# Patient Record
Sex: MALE | Race: WHITE | NOT HISPANIC OR LATINO | Employment: FULL TIME | URBAN - METROPOLITAN AREA
[De-identification: names, ages, dates, MRNs, and addresses within clinical notes are randomized per-mention and may not be internally consistent; named-entity substitution may affect disease eponyms.]

---

## 2020-06-03 ENCOUNTER — OFFICE VISIT (OUTPATIENT)
Dept: URGENT CARE | Facility: CLINIC | Age: 30
End: 2020-06-03
Payer: COMMERCIAL

## 2020-06-03 VITALS
WEIGHT: 258 LBS | RESPIRATION RATE: 20 BRPM | SYSTOLIC BLOOD PRESSURE: 112 MMHG | HEART RATE: 108 BPM | HEIGHT: 73 IN | OXYGEN SATURATION: 95 % | TEMPERATURE: 98.3 F | DIASTOLIC BLOOD PRESSURE: 80 MMHG | BODY MASS INDEX: 34.19 KG/M2

## 2020-06-03 DIAGNOSIS — L03.114 CELLULITIS OF LEFT UPPER EXTREMITY: ICD-10-CM

## 2020-06-03 DIAGNOSIS — L73.9 FOLLICULITIS: ICD-10-CM

## 2020-06-03 PROCEDURE — 99204 OFFICE O/P NEW MOD 45 MIN: CPT | Performed by: PHYSICIAN ASSISTANT

## 2020-06-03 RX ORDER — CEPHALEXIN 500 MG/1
500 CAPSULE ORAL 4 TIMES DAILY
Qty: 28 CAP | Refills: 0 | Status: ON HOLD
Start: 2020-06-03 | End: 2020-06-08

## 2020-06-03 RX ORDER — IBUPROFEN 200 MG
800 TABLET ORAL EVERY 6 HOURS PRN
COMMUNITY
End: 2020-06-18

## 2020-06-03 RX ORDER — MUPIROCIN CALCIUM 20 MG/G
CREAM TOPICAL
Qty: 1 TUBE | Refills: 0 | Status: SHIPPED
Start: 2020-06-03 | End: 2020-06-05

## 2020-06-03 ASSESSMENT — ENCOUNTER SYMPTOMS
SENSORY CHANGE: 0
FEVER: 0
DIZZINESS: 0
NAUSEA: 0
SHORTNESS OF BREATH: 0
TINGLING: 0
CHILLS: 0
FOCAL WEAKNESS: 0
VOMITING: 0
HEADACHES: 0
ABDOMINAL PAIN: 0
COUGH: 0
MYALGIAS: 0

## 2020-06-03 NOTE — PROGRESS NOTES
"Subjective:      Cyrus Zuniga is a 30 y.o. male who presents with Other (believes it's a cyst or infection, left elbow, under bilateral armpits, had on pelvic and right foot, area, green drainage, on and off x 3-4 months)        Other   This is a new problem. Pertinent negatives include no abdominal pain, chest pain, chills, coughing, fever, headaches, myalgias, nausea or vomiting.   Patient presents with 3 to 4 months of intermittent worsening acne or tender red bumps located to left side of torso as well as under bilateral armpits, and left arm.  He is reports 2 small papules to his left arm and he had popped 1 of them located near his left elbow a while ago.  Green discharge came out.  Reports mild tenderness to palpation to the lesions.  He has surrounding redness and swelling to his left arm around his elbow area that has worsened over the past couple days.  Denies any itching, fevers, chills, weakness, numbness, tingling.  No new skin contacts.  No allergies.  He denies any other complaints or concerns.     Review of Systems   Constitutional: Negative for chills, fever and malaise/fatigue.   HENT: Negative.    Respiratory: Negative for cough and shortness of breath.    Cardiovascular: Negative for chest pain.   Gastrointestinal: Negative for abdominal pain, nausea and vomiting.   Musculoskeletal: Negative for myalgias.   Skin:        Red bumps  Acne  Redness    Neurological: Negative for dizziness, tingling, sensory change, focal weakness and headaches.   All other systems reviewed and are negative.         Objective:     /80 (BP Location: Right arm, Patient Position: Sitting, BP Cuff Size: Adult long)   Pulse (!) 108   Temp 36.8 °C (98.3 °F) (Temporal)   Resp 20   Ht 1.854 m (6' 1\")   Wt 117 kg (258 lb)   SpO2 95%   BMI 34.04 kg/m²      Physical Exam  Vitals signs reviewed.   Constitutional:       General: He is not in acute distress.     Appearance: Normal appearance. He is not ill-appearing or " toxic-appearing.   HENT:      Right Ear: Tympanic membrane normal.      Left Ear: Tympanic membrane normal.      Mouth/Throat:      Mouth: Mucous membranes are moist.      Pharynx: Oropharynx is clear. No oropharyngeal exudate or posterior oropharyngeal erythema.   Eyes:      Conjunctiva/sclera: Conjunctivae normal.      Pupils: Pupils are equal, round, and reactive to light.   Neck:      Musculoskeletal: Normal range of motion and neck supple. No neck rigidity or muscular tenderness.   Cardiovascular:      Rate and Rhythm: Normal rate and regular rhythm.      Heart sounds: Normal heart sounds.   Pulmonary:      Effort: Pulmonary effort is normal. No respiratory distress.      Breath sounds: Normal breath sounds. No wheezing, rhonchi or rales.   Lymphadenopathy:      Cervical: No cervical adenopathy.   Skin:     Comments: See Images below.   Erythematous macule papules with occasional pustules.  Mild TTP.     Left elbow/arm: moderate erythematous macule with trace edema, and mild tenderness to palpation.  There is no fluctuance of the olecranon bursa.   Full flexion extension without pain.  No crepitus  No bony abnormality or bony tenderness.   Neurological:      Mental Status: He is alert.                History reviewed. No pertinent past medical history. History reviewed. No pertinent surgical history.   Social History     Socioeconomic History   • Marital status: Single     Spouse name: Not on file   • Number of children: Not on file   • Years of education: Not on file   • Highest education level: Not on file   Occupational History   • Not on file   Social Needs   • Financial resource strain: Not on file   • Food insecurity     Worry: Not on file     Inability: Not on file   • Transportation needs     Medical: Not on file     Non-medical: Not on file   Tobacco Use   • Smoking status: Never Smoker   • Smokeless tobacco: Never Used   Substance and Sexual Activity   • Alcohol use: Yes     Comment: occ   • Drug use:  No   • Sexual activity: Not on file   Lifestyle   • Physical activity     Days per week: Not on file     Minutes per session: Not on file   • Stress: Not on file   Relationships   • Social connections     Talks on phone: Not on file     Gets together: Not on file     Attends Gnosticism service: Not on file     Active member of club or organization: Not on file     Attends meetings of clubs or organizations: Not on file     Relationship status: Not on file   • Intimate partner violence     Fear of current or ex partner: Not on file     Emotionally abused: Not on file     Physically abused: Not on file     Forced sexual activity: Not on file   Other Topics Concern   • Not on file   Social History Narrative   • Not on file    Patient has no known allergies.       Assessment/Plan:     1. Folliculitis    - mupirocin calcium (BACTROBAN) 2 % Cream; Apply to affected areas 2 times per day  Dispense: 1 Tube; Refill: 0    2. Cellulitis of left upper extremity    - cephALEXin (KEFLEX) 500 MG Cap; Take 1 Cap by mouth 4 times a day for 7 days.  Dispense: 28 Cap; Refill: 0    Discussed with patient and symptoms most likely consistent with folliculitis as well as cellulitis of his left upper extremity.  There are no signs of tenosynovitis.  Suspicions for olecranon bursitis are low.  He is overall very well-appearing.     Discussed treatment of cephalexin, warm compresses to elbow, ibuprofen and Tylenol for pain or discomfort.   Return to the urgent care in 3 days for reevaluation of his cellulitis.  Return earlier if any worsening redness, swelling, drainage, fevers, chills, pain or any other concerns.  Discussed red flags and indications to present to the emergency room.    After oral therapy, start Bactroban cream to folliculitis lesions twice per day.    Supportive care, differential diagnoses, and indications for immediate follow-up discussed with patient.    Pathogenesis of diagnosis discussed including typical length and  natural progression. Patient expresses understanding and agrees to plan.    Please note that this dictation was created using voice recognition software. I have made every reasonable attempt to correct obvious errors, but I expect that there are errors of grammar and possibly content that I did not discover before finalizing the note.

## 2020-06-05 ENCOUNTER — HOSPITAL ENCOUNTER (INPATIENT)
Facility: MEDICAL CENTER | Age: 30
LOS: 3 days | DRG: 603 | End: 2020-06-08
Attending: EMERGENCY MEDICINE | Admitting: HOSPITALIST
Payer: COMMERCIAL

## 2020-06-05 ENCOUNTER — APPOINTMENT (OUTPATIENT)
Dept: RADIOLOGY | Facility: MEDICAL CENTER | Age: 30
DRG: 603 | End: 2020-06-05
Attending: EMERGENCY MEDICINE
Payer: COMMERCIAL

## 2020-06-05 ENCOUNTER — OFFICE VISIT (OUTPATIENT)
Dept: URGENT CARE | Facility: CLINIC | Age: 30
End: 2020-06-05
Payer: COMMERCIAL

## 2020-06-05 VITALS
WEIGHT: 258 LBS | DIASTOLIC BLOOD PRESSURE: 76 MMHG | OXYGEN SATURATION: 98 % | TEMPERATURE: 99.1 F | RESPIRATION RATE: 18 BRPM | BODY MASS INDEX: 34.19 KG/M2 | HEART RATE: 102 BPM | SYSTOLIC BLOOD PRESSURE: 112 MMHG | HEIGHT: 73 IN

## 2020-06-05 DIAGNOSIS — L03.114 CELLULITIS OF LEFT UPPER EXTREMITY: ICD-10-CM

## 2020-06-05 DIAGNOSIS — Z87.2 HISTORY OF FOLLICULITIS: ICD-10-CM

## 2020-06-05 DIAGNOSIS — M70.22 OLECRANON BURSITIS OF LEFT ELBOW: ICD-10-CM

## 2020-06-05 DIAGNOSIS — M25.522 LEFT ELBOW PAIN: ICD-10-CM

## 2020-06-05 LAB
ALBUMIN SERPL BCP-MCNC: 4.4 G/DL (ref 3.2–4.9)
ALBUMIN/GLOB SERPL: 1.2 G/DL
ALP SERPL-CCNC: 78 U/L (ref 30–99)
ALT SERPL-CCNC: 54 U/L (ref 2–50)
ANION GAP SERPL CALC-SCNC: 11 MMOL/L (ref 7–16)
AST SERPL-CCNC: 29 U/L (ref 12–45)
BASOPHILS # BLD AUTO: 0.3 % (ref 0–1.8)
BASOPHILS # BLD: 0.04 K/UL (ref 0–0.12)
BILIRUB SERPL-MCNC: 1 MG/DL (ref 0.1–1.5)
BUN SERPL-MCNC: 18 MG/DL (ref 8–22)
CALCIUM SERPL-MCNC: 9.5 MG/DL (ref 8.5–10.5)
CHLORIDE SERPL-SCNC: 104 MMOL/L (ref 96–112)
CO2 SERPL-SCNC: 23 MMOL/L (ref 20–33)
CREAT SERPL-MCNC: 0.89 MG/DL (ref 0.5–1.4)
EOSINOPHIL # BLD AUTO: 0.09 K/UL (ref 0–0.51)
EOSINOPHIL NFR BLD: 0.7 % (ref 0–6.9)
ERYTHROCYTE [DISTWIDTH] IN BLOOD BY AUTOMATED COUNT: 38.7 FL (ref 35.9–50)
GLOBULIN SER CALC-MCNC: 3.7 G/DL (ref 1.9–3.5)
GLUCOSE SERPL-MCNC: 103 MG/DL (ref 65–99)
HCT VFR BLD AUTO: 52 % (ref 42–52)
HGB BLD-MCNC: 17.8 G/DL (ref 14–18)
IMM GRANULOCYTES # BLD AUTO: 0.04 K/UL (ref 0–0.11)
IMM GRANULOCYTES NFR BLD AUTO: 0.3 % (ref 0–0.9)
LACTATE BLD-SCNC: 1.3 MMOL/L (ref 0.5–2)
LYMPHOCYTES # BLD AUTO: 1.85 K/UL (ref 1–4.8)
LYMPHOCYTES NFR BLD: 14.6 % (ref 22–41)
MCH RBC QN AUTO: 29.5 PG (ref 27–33)
MCHC RBC AUTO-ENTMCNC: 34.2 G/DL (ref 33.7–35.3)
MCV RBC AUTO: 86.1 FL (ref 81.4–97.8)
MONOCYTES # BLD AUTO: 0.81 K/UL (ref 0–0.85)
MONOCYTES NFR BLD AUTO: 6.4 % (ref 0–13.4)
NEUTROPHILS # BLD AUTO: 9.81 K/UL (ref 1.82–7.42)
NEUTROPHILS NFR BLD: 77.7 % (ref 44–72)
NRBC # BLD AUTO: 0 K/UL
NRBC BLD-RTO: 0 /100 WBC
PLATELET # BLD AUTO: 195 K/UL (ref 164–446)
PMV BLD AUTO: 10 FL (ref 9–12.9)
POTASSIUM SERPL-SCNC: 4.1 MMOL/L (ref 3.6–5.5)
PROT SERPL-MCNC: 8.1 G/DL (ref 6–8.2)
RBC # BLD AUTO: 6.04 M/UL (ref 4.7–6.1)
SODIUM SERPL-SCNC: 138 MMOL/L (ref 135–145)
WBC # BLD AUTO: 12.6 K/UL (ref 4.8–10.8)

## 2020-06-05 PROCEDURE — 87040 BLOOD CULTURE FOR BACTERIA: CPT

## 2020-06-05 PROCEDURE — 96365 THER/PROPH/DIAG IV INF INIT: CPT

## 2020-06-05 PROCEDURE — 96375 TX/PRO/DX INJ NEW DRUG ADDON: CPT

## 2020-06-05 PROCEDURE — 700111 HCHG RX REV CODE 636 W/ 250 OVERRIDE (IP): Performed by: EMERGENCY MEDICINE

## 2020-06-05 PROCEDURE — 73080 X-RAY EXAM OF ELBOW: CPT | Mod: LT

## 2020-06-05 PROCEDURE — 83605 ASSAY OF LACTIC ACID: CPT

## 2020-06-05 PROCEDURE — 700111 HCHG RX REV CODE 636 W/ 250 OVERRIDE (IP): Performed by: INTERNAL MEDICINE

## 2020-06-05 PROCEDURE — 96366 THER/PROPH/DIAG IV INF ADDON: CPT

## 2020-06-05 PROCEDURE — 99285 EMERGENCY DEPT VISIT HI MDM: CPT

## 2020-06-05 PROCEDURE — 99222 1ST HOSP IP/OBS MODERATE 55: CPT | Performed by: HOSPITALIST

## 2020-06-05 PROCEDURE — 700105 HCHG RX REV CODE 258: Performed by: EMERGENCY MEDICINE

## 2020-06-05 PROCEDURE — 85025 COMPLETE CBC W/AUTO DIFF WBC: CPT

## 2020-06-05 PROCEDURE — 700111 HCHG RX REV CODE 636 W/ 250 OVERRIDE (IP): Performed by: HOSPITALIST

## 2020-06-05 PROCEDURE — 700105 HCHG RX REV CODE 258: Performed by: HOSPITALIST

## 2020-06-05 PROCEDURE — 96367 TX/PROPH/DG ADDL SEQ IV INF: CPT

## 2020-06-05 PROCEDURE — 770006 HCHG ROOM/CARE - MED/SURG/GYN SEMI*

## 2020-06-05 PROCEDURE — 80053 COMPREHEN METABOLIC PANEL: CPT

## 2020-06-05 PROCEDURE — 99214 OFFICE O/P EST MOD 30 MIN: CPT | Performed by: PHYSICIAN ASSISTANT

## 2020-06-05 PROCEDURE — 700105 HCHG RX REV CODE 258: Performed by: INTERNAL MEDICINE

## 2020-06-05 RX ORDER — PROCHLORPERAZINE EDISYLATE 5 MG/ML
5-10 INJECTION INTRAMUSCULAR; INTRAVENOUS EVERY 4 HOURS PRN
Status: DISCONTINUED | OUTPATIENT
Start: 2020-06-05 | End: 2020-06-08 | Stop reason: HOSPADM

## 2020-06-05 RX ORDER — AMOXICILLIN 250 MG
2 CAPSULE ORAL 2 TIMES DAILY
Status: DISCONTINUED | OUTPATIENT
Start: 2020-06-05 | End: 2020-06-08 | Stop reason: HOSPADM

## 2020-06-05 RX ORDER — PROMETHAZINE HYDROCHLORIDE 25 MG/1
12.5-25 TABLET ORAL EVERY 4 HOURS PRN
Status: DISCONTINUED | OUTPATIENT
Start: 2020-06-05 | End: 2020-06-08 | Stop reason: HOSPADM

## 2020-06-05 RX ORDER — ONDANSETRON 2 MG/ML
4 INJECTION INTRAMUSCULAR; INTRAVENOUS EVERY 4 HOURS PRN
Status: DISCONTINUED | OUTPATIENT
Start: 2020-06-05 | End: 2020-06-08 | Stop reason: HOSPADM

## 2020-06-05 RX ORDER — ONDANSETRON 4 MG/1
4 TABLET, ORALLY DISINTEGRATING ORAL EVERY 4 HOURS PRN
Status: DISCONTINUED | OUTPATIENT
Start: 2020-06-05 | End: 2020-06-08 | Stop reason: HOSPADM

## 2020-06-05 RX ORDER — OXYCODONE HYDROCHLORIDE 5 MG/1
5-10 TABLET ORAL EVERY 4 HOURS PRN
Status: DISCONTINUED | OUTPATIENT
Start: 2020-06-05 | End: 2020-06-08 | Stop reason: HOSPADM

## 2020-06-05 RX ORDER — IBUPROFEN 800 MG/1
800 TABLET ORAL EVERY 6 HOURS PRN
Status: DISCONTINUED | OUTPATIENT
Start: 2020-06-05 | End: 2020-06-08 | Stop reason: HOSPADM

## 2020-06-05 RX ORDER — BISACODYL 10 MG
10 SUPPOSITORY, RECTAL RECTAL
Status: DISCONTINUED | OUTPATIENT
Start: 2020-06-05 | End: 2020-06-08 | Stop reason: HOSPADM

## 2020-06-05 RX ORDER — POLYETHYLENE GLYCOL 3350 17 G/17G
1 POWDER, FOR SOLUTION ORAL
Status: DISCONTINUED | OUTPATIENT
Start: 2020-06-05 | End: 2020-06-08 | Stop reason: HOSPADM

## 2020-06-05 RX ORDER — ACETAMINOPHEN 325 MG/1
650 TABLET ORAL EVERY 6 HOURS PRN
Status: DISCONTINUED | OUTPATIENT
Start: 2020-06-05 | End: 2020-06-08 | Stop reason: HOSPADM

## 2020-06-05 RX ORDER — PROMETHAZINE HYDROCHLORIDE 25 MG/1
12.5-25 SUPPOSITORY RECTAL EVERY 4 HOURS PRN
Status: DISCONTINUED | OUTPATIENT
Start: 2020-06-05 | End: 2020-06-08 | Stop reason: HOSPADM

## 2020-06-05 RX ORDER — KETOROLAC TROMETHAMINE 30 MG/ML
15 INJECTION, SOLUTION INTRAMUSCULAR; INTRAVENOUS ONCE
Status: COMPLETED | OUTPATIENT
Start: 2020-06-05 | End: 2020-06-05

## 2020-06-05 RX ADMIN — KETOROLAC TROMETHAMINE 15 MG: 30 INJECTION, SOLUTION INTRAMUSCULAR at 11:21

## 2020-06-05 RX ADMIN — VANCOMYCIN HYDROCHLORIDE 1400 MG: 500 INJECTION, POWDER, LYOPHILIZED, FOR SOLUTION INTRAVENOUS at 18:31

## 2020-06-05 RX ADMIN — AMPICILLIN SODIUM AND SULBACTAM SODIUM 3 G: 2; 1 INJECTION, POWDER, FOR SOLUTION INTRAMUSCULAR; INTRAVENOUS at 09:58

## 2020-06-05 RX ADMIN — VANCOMYCIN HYDROCHLORIDE 2900 MG: 500 INJECTION, POWDER, LYOPHILIZED, FOR SOLUTION INTRAVENOUS at 10:15

## 2020-06-05 SDOH — ECONOMIC STABILITY: TRANSPORTATION INSECURITY
IN THE PAST 12 MONTHS, HAS THE LACK OF TRANSPORTATION KEPT YOU FROM MEDICAL APPOINTMENTS OR FROM GETTING MEDICATIONS?: NO

## 2020-06-05 SDOH — ECONOMIC STABILITY: FOOD INSECURITY: WITHIN THE PAST 12 MONTHS, THE FOOD YOU BOUGHT JUST DIDN'T LAST AND YOU DIDN'T HAVE MONEY TO GET MORE.: NEVER TRUE

## 2020-06-05 SDOH — HEALTH STABILITY: MENTAL HEALTH: HOW OFTEN DO YOU HAVE A DRINK CONTAINING ALCOHOL?: 2-4 TIMES A MONTH

## 2020-06-05 SDOH — ECONOMIC STABILITY: FOOD INSECURITY: WITHIN THE PAST 12 MONTHS, YOU WORRIED THAT YOUR FOOD WOULD RUN OUT BEFORE YOU GOT MONEY TO BUY MORE.: NEVER TRUE

## 2020-06-05 SDOH — HEALTH STABILITY: MENTAL HEALTH: HOW MANY STANDARD DRINKS CONTAINING ALCOHOL DO YOU HAVE ON A TYPICAL DAY?: 1 OR 2

## 2020-06-05 SDOH — HEALTH STABILITY: MENTAL HEALTH: HOW OFTEN DO YOU HAVE 6 OR MORE DRINKS ON ONE OCCASION?: NEVER

## 2020-06-05 SDOH — ECONOMIC STABILITY: TRANSPORTATION INSECURITY
IN THE PAST 12 MONTHS, HAS LACK OF TRANSPORTATION KEPT YOU FROM MEETINGS, WORK, OR FROM GETTING THINGS NEEDED FOR DAILY LIVING?: NO

## 2020-06-05 ASSESSMENT — LIFESTYLE VARIABLES
HAVE PEOPLE ANNOYED YOU BY CRITICIZING YOUR DRINKING: NO
TOTAL SCORE: 0
EVER HAD A DRINK FIRST THING IN THE MORNING TO STEADY YOUR NERVES TO GET RID OF A HANGOVER: NO
EVER FELT BAD OR GUILTY ABOUT YOUR DRINKING: NO
ALCOHOL_USE: YES
TOTAL SCORE: 0
TOTAL SCORE: 0
ON A TYPICAL DAY WHEN YOU DRINK ALCOHOL HOW MANY DRINKS DO YOU HAVE: 2
TOTAL SCORE: 0
ALCOHOL_USE: NO
AVERAGE NUMBER OF DAYS PER WEEK YOU HAVE A DRINK CONTAINING ALCOHOL: 1
TOTAL SCORE: 0
DOES PATIENT WANT TO STOP DRINKING: NO
EVER_SMOKED: NEVER
HAVE YOU EVER FELT YOU SHOULD CUT DOWN ON YOUR DRINKING: NO
HAVE PEOPLE ANNOYED YOU BY CRITICIZING YOUR DRINKING: NO
HOW MANY TIMES IN THE PAST YEAR HAVE YOU HAD 5 OR MORE DRINKS IN A DAY: 1
HAVE YOU EVER FELT YOU SHOULD CUT DOWN ON YOUR DRINKING: NO
ON A TYPICAL DAY WHEN YOU DRINK ALCOHOL HOW MANY DRINKS DO YOU HAVE: -1
TOTAL SCORE: 0
DOES PATIENT WANT TO STOP DRINKING: NO
CONSUMPTION TOTAL: INCOMPLETE
EVER FELT BAD OR GUILTY ABOUT YOUR DRINKING: NO
EVER HAD A DRINK FIRST THING IN THE MORNING TO STEADY YOUR NERVES TO GET RID OF A HANGOVER: NO
CONSUMPTION TOTAL: POSITIVE

## 2020-06-05 ASSESSMENT — COGNITIVE AND FUNCTIONAL STATUS - GENERAL
SUGGESTED CMS G CODE MODIFIER DAILY ACTIVITY: CH
SUGGESTED CMS G CODE MODIFIER MOBILITY: CH
SUGGESTED CMS G CODE MODIFIER MOBILITY: CI
MOBILITY SCORE: 23
MOBILITY SCORE: 24
DAILY ACTIVITIY SCORE: 24
CLIMB 3 TO 5 STEPS WITH RAILING: A LITTLE

## 2020-06-05 ASSESSMENT — ENCOUNTER SYMPTOMS
ABDOMINAL PAIN: 0
VOMITING: 0
DOUBLE VISION: 0
FEVER: 0
JOINT SWELLING: 1
SORE THROAT: 0
DIARRHEA: 0
PALPITATIONS: 0
DIARRHEA: 0
CHILLS: 0
MYALGIAS: 1
BACK PAIN: 0
CHILLS: 0
FALLS: 0
HEADACHES: 0
COUGH: 0
NAUSEA: 0
LOSS OF CONSCIOUSNESS: 0
VOMITING: 0
BLURRED VISION: 0
FEVER: 0
DIZZINESS: 0
SHORTNESS OF BREATH: 0

## 2020-06-05 ASSESSMENT — PATIENT HEALTH QUESTIONNAIRE - PHQ9
1. LITTLE INTEREST OR PLEASURE IN DOING THINGS: NOT AT ALL
2. FEELING DOWN, DEPRESSED, IRRITABLE, OR HOPELESS: NOT AT ALL
SUM OF ALL RESPONSES TO PHQ9 QUESTIONS 1 AND 2: 0

## 2020-06-05 ASSESSMENT — COPD QUESTIONNAIRES
DO YOU EVER COUGH UP ANY MUCUS OR PHLEGM?: YES, A FEW DAYS A WEEK OR MONTH
DURING THE PAST 4 WEEKS HOW MUCH DID YOU FEEL SHORT OF BREATH: NONE/LITTLE OF THE TIME
IN THE PAST 12 MONTHS DO YOU DO LESS THAN YOU USED TO BECAUSE OF YOUR BREATHING PROBLEMS: DISAGREE/UNSURE
HAVE YOU SMOKED AT LEAST 100 CIGARETTES IN YOUR ENTIRE LIFE: NO/DON'T KNOW

## 2020-06-05 NOTE — ASSESSMENT & PLAN NOTE
-With small subcutaneous abscess.  I have manually expressed through firm pressure the solidified pus from the left elbow abscess, and suspect that abscess has been fully debrided. Failed outpatient Keflex.  WBC count remains normal.  -Continue IV vancomycin.  Trend CRP. Follow wound culture.  -Continue to monitor clinically for improvement with antibiotic therapy.  -Continue analgesics with oxycodone, and ibuprofen as needed..

## 2020-06-05 NOTE — H&P
Hospital Medicine History & Physical Note    Date of Service  6/5/2020    Primary Care Physician  Pcp Pt States None    Code Status  Full Code    Chief Complaint  Chief Complaint   Patient presents with   • Elbow Pain     left x4d +redness +swelling   • Sent from Urgent Care       History of Presenting Illness  30 y.o. male who presented 6/5/2020 with swelling and redness about the left elbow.  Symptoms began about 4 days ago, and have been slowly getting worse.  He was seen at an urgent care and started on Keflex, and has been taking that for the last 48 hours however despite this he has not gotten better.  He denies any fever but he has had some chills in the last 24 hours.  He has no numbness or tingling in the distal hand.    Review of Systems  Review of Systems   Constitutional: Negative for chills and fever.   HENT: Negative for nosebleeds and sore throat.    Eyes: Negative for blurred vision and double vision.   Respiratory: Negative for cough and shortness of breath.    Cardiovascular: Negative for chest pain, palpitations and leg swelling.   Gastrointestinal: Negative for abdominal pain, diarrhea, nausea and vomiting.   Genitourinary: Negative for dysuria and urgency.   Musculoskeletal: Negative for back pain.   Skin: Positive for rash.   Neurological: Negative for dizziness, loss of consciousness and headaches.       Past Medical History  Patient denies any previous medical history    Surgical History  She denies any previous surgeries    Family History  When asked about his family history, he states he is uncertain if his parents have any medical problems but he thinks they are both healthy.  He is unaware of any other medical issues in the family.    Social History   reports that he has never smoked. He has never used smokeless tobacco. He reports current alcohol use of about 1.2 - 1.8 oz of alcohol per week. He reports that he does not use drugs.    Allergies  No Known Allergies    Medications  Prior to  Admission Medications   Prescriptions Last Dose Informant Patient Reported? Taking?   cephALEXin (KEFLEX) 500 MG Cap 6/4/2020 at PM Patient No No   Sig: Take 1 Cap by mouth 4 times a day for 7 days.   ibuprofen (MOTRIN) 200 MG Tab 6/4/2020 at PM Patient Yes No   Sig: Take 800 mg by mouth every 6 hours as needed.      Facility-Administered Medications: None       Physical Exam  Temp:  [36.9 °C (98.4 °F)-36.9 °C (98.5 °F)] 36.9 °C (98.5 °F)  Pulse:  [] 98  Resp:  [18] 18  BP: (122-136)/(84-88) 124/85  SpO2:  [95 %-98 %] 95 %    Physical Exam  Constitutional:       General: He is not in acute distress.     Appearance: He is well-developed. He is not diaphoretic.   HENT:      Head: Normocephalic and atraumatic.   Eyes:      Conjunctiva/sclera: Conjunctivae normal.   Neck:      Vascular: No JVD.   Cardiovascular:      Rate and Rhythm: Normal rate.      Heart sounds: No murmur. No gallop.    Pulmonary:      Effort: Pulmonary effort is normal. No respiratory distress.      Breath sounds: No stridor. No wheezing or rales.   Abdominal:      Palpations: Abdomen is soft.      Tenderness: There is no abdominal tenderness. There is no guarding or rebound.   Musculoskeletal:         General: No tenderness.      Right lower leg: No edema.      Left lower leg: No edema.      Comments: Area of erythema about the tip of the left olecranon, this extends about 4 cm proximally and then 4 cm distally down the posterior aspect of the upper and lower arm.  There is a small central scab, but no areas of fluctuance, bullae formation or subcutaneous emphysema.  Range of motion of the elbow is full in terms of flexion extension and supination pronation.  There is minimal pain with movement.  Distal neurovascular status is intact   Skin:     General: Skin is warm and dry.      Findings: No rash.   Neurological:      General: No focal deficit present.      Mental Status: He is alert and oriented to person, place, and time. Mental status is  at baseline.   Psychiatric:         Mood and Affect: Mood normal.         Thought Content: Thought content normal.         Laboratory:  Recent Labs     06/05/20  0956   WBC 12.6*   RBC 6.04   HEMOGLOBIN 17.8   HEMATOCRIT 52.0   MCV 86.1   MCH 29.5   MCHC 34.2   RDW 38.7   PLATELETCT 195   MPV 10.0     Recent Labs     06/05/20  0956   SODIUM 138   POTASSIUM 4.1   CHLORIDE 104   CO2 23   GLUCOSE 103*   BUN 18   CREATININE 0.89   CALCIUM 9.5     Recent Labs     06/05/20  0956   ALTSGPT 54*   ASTSGOT 29   ALKPHOSPHAT 78   TBILIRUBIN 1.0   GLUCOSE 103*         No results for input(s): NTPROBNP in the last 72 hours.      No results for input(s): TROPONINT in the last 72 hours.    Imaging:  DX-ELBOW-COMPLETE 3+ LEFT   Final Result      No evidence of fracture or dislocation.            Assessment/Plan:      Cellulitis of left upper extremity  Assessment & Plan  Patient presents with cellulitis to the posterior aspect of the left elbow.  It does not appear to involve the deep tissue and specifically the bursa, or the joint itself.  He has taken Keflex for 48 hours however despite this has worsened.  We will admit the patient and start him on IV vancomycin  As he has had some constitutional symptoms we will check blood cultures and follow these results.  Continue to be vigilant for deep tissue infection which does not appear to be the case at least at present.

## 2020-06-05 NOTE — PROGRESS NOTES
"Pharmacy Vancomycin Kinetics     Cyrus Zuniga is a 30 y.o. male on vancomycin day # 1 for an indication of olecranon bursitis with associated cellulitis.    Anticipated Duration of Therapy:  TBD    Current dosing:  · Vancomycin 2900 mg IV loading dose on   · Vancomycin 1400 mg IV every 8 hours    Vancomycin history:  · None found in EPIC    History of Present Illness:   Admitted on 2020 for worsening cellulitis despite outpatient keflex x 48 hours, symptoms started 4 days PTA.  Patient is believed to have bursitis with associated cellulitis, no definitive fluid collection noted.  Chills reported - blood cultures obtained.  Pertinent past medical history per chart.    Antimicrobial history for admission:   · Ampicillin/sulbactam   · Vancomycin  - current    Allergies: Patient has no known allergies.     Concerns for renal function:  Obesity    Pertinent cultures to date:   ·  - Blood cultures:  pending    MRSA nares swab ordered: N/A    Recent Labs     20  0956   WBC 12.6*   NEUTSPOLYS 77.70*     Recent Labs     20  0956   BUN 18   CREATININE 0.89   ALBUMIN 4.4     No results for input(s): VANCOTROUGH, VANCOPEAK, VANCORANDOM in the last 72 hours.    Intake/Output Summary (Last 24 hours) at 2020 1614  Last data filed at 2020 1016  Gross per 24 hour   Intake 100 ml   Output --   Net 100 ml      /79   Pulse 87   Temp 36.7 °C (98 °F) (Axillary)   Resp 16   Ht 1.854 m (6' 1\")   Wt 115 kg (253 lb 8.5 oz)   SpO2 97%  Temp (24hrs), Av.8 °C (98.3 °F), Min:36.7 °C (98 °F), Max:36.9 °C (98.5 °F)      Assessment and Plan:  1. Dosing regimen and changes: New start  2. Next vancomycin level: In 1-2 days, not yet ordered  3. Goal trough: 12-16 mcg/mL for indication of SSTI  4. Comments and plan: New start vancomycin for bursitis with associated cellulitis.  Patient failed outpatient keflex PTA.  Blood cultures pending.  Obesity noted - places patient at increased risk for " vancomycin accumulation.  Dosed conservatively given concern.  Pharmacy to follow daily during therapy.    Thank you!  Beverly Matos, PharmD, BCCCP

## 2020-06-05 NOTE — PROGRESS NOTES
2 RN skin check complete with RNZeenat.   Devices in place : PIV.  Skin assessed under devices : yes.  Confirmed pressure ulcers found on NA.  New potential pressure ulcers noted on NA. Wound consult placed NA.    The following skin issues noted: right elbow is red and blanching  Left arm is warm, swollen, red and blanching. Scattered pimple like dots noted all over ABD. Large rash like red area under bilateral axillary.  Heels are slightly dry, red and and blanching mosturizer cream applied. Pt declined Mepilex.

## 2020-06-05 NOTE — ED TRIAGE NOTES
Pt amb to triage in a mask.  Chief Complaint   Patient presents with   • Elbow Pain     left x4d +redness +swelling   • Sent from Urgent Care     Pt has had several skin lesions on torso in past month, then elbow started to swell 4d ago. Has been taking oral abx x3d. No improvement. Sent by  for further eval.  Pt denies cough, fever, sob or any known contact with a person that has tested positive for covid.

## 2020-06-05 NOTE — ED PROVIDER NOTES
"ED Provider Note    CHIEF COMPLAINT  Chief Complaint   Patient presents with   • Elbow Pain     left x4d +redness +swelling   • Sent from Urgent Care       HPI  Cyrus Zuniga is a 30 y.o. male who presents to the emergency department with left elbow pain.  Started spontaneously 4 days ago.  No trauma.  First a small area of redness over the elbow and has gradually worsened.  Seen at the urgent care and started on Keflex.  Despite Keflex continues to worsen.  Has had some chills.  No fever.  Has pain throbbing nonradiating constant worse when he moves the elbow.    Patient notes that he has had multiple areas of infected hair follicles lately.  He works at the [employer].    REVIEW OF SYSTEMS  As per HPI, otherwise a 10 point review of systems is negative    PAST MEDICAL HISTORY  Denies diabetes or immunocompromise.    SOCIAL HISTORY  Social History     Tobacco Use   • Smoking status: Never Smoker   • Smokeless tobacco: Never Used   Substance Use Topics   • Alcohol use: Yes     Comment: occ   • Drug use: No       SURGICAL HISTORY  No past surgical history on file.    CURRENT MEDICATIONS  Home Medications     Reviewed by Teresa Chandler R.N. (Registered Nurse) on 06/05/20 at 0924  Med List Status: Complete   Medication Last Dose Status   acetaZOLAMIDE (DIAMOX) 125 MG Tab not taking Active   cephALEXin (KEFLEX) 500 MG Cap 6/4/2020 Active   ibuprofen (MOTRIN) 200 MG Tab 6/4/2020 Active   mupirocin calcium (BACTROBAN) 2 % Cream not filled yet Active   ondansetron (ZOFRAN) 4 MG Tab tablet not taking Active                ALLERGIES  No Known Allergies    PHYSICAL EXAM  VITAL SIGNS: /88   Pulse (!) 104   Temp 36.9 °C (98.4 °F) (Temporal)   Resp 18   Ht 1.854 m (6' 1\")   Wt 115 kg (253 lb 8.5 oz)   SpO2 98%   BMI 33.45 kg/m²    Constitutional: Awake and alert  HENT:  Atraumatic, Normocephalic.Oropharynx moist mucous membranes  Eyes: Normal inspection  Neck: Supple  Cardiovascular: " Mildly elevated heart rate, Normal rhythm.  Symmetric peripheral pulses.   Thorax & Lungs: No respiratory distress, No wheezing, No rales, No rhonchi, No chest tenderness.   Abdomen: Bowel sounds normal, soft, non-distended, nontender, no mass  Skin: Few areas of folliculitis over his thorax.  Patient has redness warmth and swelling over the right elbow particularly over the olecranon.  There is no fluctuance although this olecranon bursa is tender.  He does not have remarkable pain with range of motion of the elbow to suggest septic arthritis.  There is cellulitis that tracks up the posterior left arm.  Bedside ultrasound was performed.  Findings were most consistent with cellulitis rather than distinct abscess amenable to drainage.  Back: No tenderness, No CVA tenderness.   Extremities: Olecranon bursitis with associated cellulitis as described above.  Neurologic: Grossly normal   Psychiatric: Anxious appearing    RADIOLOGY/PROCEDURES  DX-ELBOW-COMPLETE 3+ LEFT   Final Result      No evidence of fracture or dislocation.           Imaging is interpreted by radiologist    Labs:  Results for orders placed or performed during the hospital encounter of 06/05/20   LACTIC ACID   Result Value Ref Range    Lactic Acid 1.3 0.5 - 2.0 mmol/L   CBC WITH DIFFERENTIAL   Result Value Ref Range    WBC 12.6 (H) 4.8 - 10.8 K/uL    RBC 6.04 4.70 - 6.10 M/uL    Hemoglobin 17.8 14.0 - 18.0 g/dL    Hematocrit 52.0 42.0 - 52.0 %    MCV 86.1 81.4 - 97.8 fL    MCH 29.5 27.0 - 33.0 pg    MCHC 34.2 33.7 - 35.3 g/dL    RDW 38.7 35.9 - 50.0 fL    Platelet Count 195 164 - 446 K/uL    MPV 10.0 9.0 - 12.9 fL    Neutrophils-Polys 77.70 (H) 44.00 - 72.00 %    Lymphocytes 14.60 (L) 22.00 - 41.00 %    Monocytes 6.40 0.00 - 13.40 %    Eosinophils 0.70 0.00 - 6.90 %    Basophils 0.30 0.00 - 1.80 %    Immature Granulocytes 0.30 0.00 - 0.90 %    Nucleated RBC 0.00 /100 WBC    Neutrophils (Absolute) 9.81 (H) 1.82 - 7.42 K/uL    Lymphs (Absolute) 1.85 1.00  - 4.80 K/uL    Monos (Absolute) 0.81 0.00 - 0.85 K/uL    Eos (Absolute) 0.09 0.00 - 0.51 K/uL    Baso (Absolute) 0.04 0.00 - 0.12 K/uL    Immature Granulocytes (abs) 0.04 0.00 - 0.11 K/uL    NRBC (Absolute) 0.00 K/uL   COMP METABOLIC PANEL   Result Value Ref Range    Sodium 138 135 - 145 mmol/L    Potassium 4.1 3.6 - 5.5 mmol/L    Chloride 104 96 - 112 mmol/L    Co2 23 20 - 33 mmol/L    Anion Gap 11.0 7.0 - 16.0    Glucose 103 (H) 65 - 99 mg/dL    Bun 18 8 - 22 mg/dL    Creatinine 0.89 0.50 - 1.40 mg/dL    Calcium 9.5 8.5 - 10.5 mg/dL    AST(SGOT) 29 12 - 45 U/L    ALT(SGPT) 54 (H) 2 - 50 U/L    Alkaline Phosphatase 78 30 - 99 U/L    Total Bilirubin 1.0 0.1 - 1.5 mg/dL    Albumin 4.4 3.2 - 4.9 g/dL    Total Protein 8.1 6.0 - 8.2 g/dL    Globulin 3.7 (H) 1.9 - 3.5 g/dL    A-G Ratio 1.2 g/dL   ESTIMATED GFR   Result Value Ref Range    GFR If African American >60 >60 mL/min/1.73 m 2    GFR If Non African American >60 >60 mL/min/1.73 m 2       Medications   vancomycin (VANCOCIN) 2,900 mg in  mL IVPB (2,900 mg Intravenous New Bag 6/5/20 1015)   ampicillin/sulbactam (UNASYN) 3 g in  mL IVPB (0 g Intravenous Stopped 6/5/20 1016)       COURSE & MEDICAL DECISION MAKING  Patient presents with left upper extremity cellulitis that appears most centered around the olecranon bursa.  Suspect bursitis.  I do not see any major fluid collection to drain on bedside ultrasound.  He has failed outpatient treatment with Keflex.  Work-up was undertaken.  Data returned as above.  I ordered Unasyn and vancomycin.  Given failure of outpatient management with significant cellulitis I would like to admit the patient for observation.  Further assess to determine if requirement for aspiration of bursa.  There is no suggestion of septic arthritis at this time.    FINAL IMPRESSION  1.  Olecranon bursitis with associated cellulitis      This dictation was created using voice recognition software. The accuracy of the dictation is limited  to the abilities of the software.  The nursing notes were reviewed and certain aspects of this information were incorporated into this note.      Electronically signed by: Joel Victor M.D., 6/5/2020 11:06 AM

## 2020-06-05 NOTE — CARE PLAN
Problem: Communication  Goal: The ability to communicate needs accurately and effectively will improve  Outcome: PROGRESSING AS EXPECTED  Intervention: [place] patient and significant other/support system to call light to alert staff of needs  Note: Pt encouraged  to voice feelings     Problem: Infection  Goal: Will remain free from infection  Outcome: PROGRESSING AS EXPECTED  Intervention: Assess signs and symptoms of infection  Note: Regularly assess pt for worsening signs of infection

## 2020-06-05 NOTE — PROGRESS NOTES
"Subjective:      Cyrus Zuniga is a 30 y.o. male who presents with Elbow Pain (x4 days LEFT, stiffness, redness, swelling )            Patient is a 30-year-old male who presents to urgent care for recheck of his left elbow along with areas of folliculitis.  Patient had original evaluation 2 days ago when he was started on cephalexin along with Bactroban for notable folliculitis along with suspected cellulitis of the left elbow-forearm.  Patient reports that the folliculitis and skin papules have all been improving however reports that his elbow has increased with redness, swelling and pain.  He has been compliant with the cephalexin.  He denies any drainage from the elbow that he is aware of.  He does report that full extension along with flexion aggravates elbow.  He denies fevers but does report notable fatigue.  He does report that the redness has extended down his forearm and up his bicep as well.  Denies any fevers or body aches.    Wound Infection   This is a new problem. The current episode started in the past 7 days. The problem occurs constantly. Progression since onset: Improved folliculitis, worsening left elbow.  Associated symptoms include joint swelling and myalgias. Pertinent negatives include no chills, fever, rash or vomiting. Exacerbated by: Movement of his elbow and pressure over the arm.  Treatments tried: As above .       Review of Systems   Constitutional: Positive for malaise/fatigue. Negative for chills and fever.   Gastrointestinal: Negative for diarrhea and vomiting.   Musculoskeletal: Positive for joint pain, joint swelling and myalgias. Negative for falls.   Skin: Negative for rash.   All other systems reviewed and are negative.         Objective:     /76   Pulse (!) 102   Temp 37.3 °C (99.1 °F) (Temporal)   Resp 18   Ht 1.854 m (6' 1\")   Wt 117 kg (258 lb)   SpO2 98%   BMI 34.04 kg/m²    PMH:  has no past medical history on file.  MEDS: Reviewed .   ALLERGIES: No Known " Allergies  SURGHX: No past surgical history on file.  SOCHX:  reports that he has never smoked. He has never used smokeless tobacco. He reports current alcohol use. He reports that he does not use drugs.  FH: Family history was reviewed, no pertinent findings to report    Physical Exam  Vitals signs reviewed.   Constitutional:       General: He is not in acute distress.     Appearance: He is well-developed.   HENT:      Head: Normocephalic and atraumatic.      Right Ear: External ear normal.      Mouth/Throat:      Pharynx: No oropharyngeal exudate.   Eyes:      Conjunctiva/sclera: Conjunctivae normal.      Pupils: Pupils are equal, round, and reactive to light.   Neck:      Musculoskeletal: Normal range of motion and neck supple.      Trachea: No tracheal deviation.   Cardiovascular:      Rate and Rhythm: Tachycardia present.   Pulmonary:      Effort: Pulmonary effort is normal. No respiratory distress.   Musculoskeletal: Normal range of motion.   Skin:     General: Skin is warm.      Findings: Rash present.             Comments: Scattered erythematous papules to bilateral chest/axilla region. Without drainage. Without discrete fluctuance or abscess formation.   Left elbow/forearm- diffuse erythema and edema- from posterior elbow extending to mid shaft of the left forearm- noted papule to the posterior elbow with slight fluctuance. Diffuse tenderness. Increased warmth throughout. Area is dry.        Neurological:      Mental Status: He is alert and oriented to person, place, and time.      Coordination: Coordination normal.   Psychiatric:         Behavior: Behavior normal.         Thought Content: Thought content normal.         Judgment: Judgment normal.                 Assessment/Plan:       1. Left elbow pain  2. Cellulitis of left upper extremity  3. History of folliculitis    Folliculitis with notable improvement however concern for worsening redness, swelling and pain in particular in the elbow joint-concern  for outpatient antibiotic failure along with potential for septic joint vs abscess-  as patient is having pain with movement.  Feel that patient needs further evaluation and higher level of care as he is worsened with usage of Keflex.   He is agreeable to have ED evaluation- uncertain which ED he will go to however is agreeable to go.   Pt was stable throughout duration of the visit today.     Please note that this dictation was created using voice recognition software. I have made every reasonable attempt to correct obvious errors, but I expect that there are errors of grammar and possibly content that I did not discover before finalizing the note.

## 2020-06-05 NOTE — ED NOTES
Pt is currently taking cephalexin for folliculitis.  Elbow swelled up over last 2 days and he was instructed by UC to come to ER for eval.

## 2020-06-06 ENCOUNTER — APPOINTMENT (OUTPATIENT)
Dept: RADIOLOGY | Facility: MEDICAL CENTER | Age: 30
DRG: 603 | End: 2020-06-06
Attending: INTERNAL MEDICINE
Payer: COMMERCIAL

## 2020-06-06 LAB
ANION GAP SERPL CALC-SCNC: 12 MMOL/L (ref 7–16)
BASOPHILS # BLD AUTO: 0.3 % (ref 0–1.8)
BASOPHILS # BLD: 0.03 K/UL (ref 0–0.12)
BUN SERPL-MCNC: 16 MG/DL (ref 8–22)
CALCIUM SERPL-MCNC: 9.1 MG/DL (ref 8.5–10.5)
CHLORIDE SERPL-SCNC: 106 MMOL/L (ref 96–112)
CO2 SERPL-SCNC: 19 MMOL/L (ref 20–33)
CREAT SERPL-MCNC: 0.82 MG/DL (ref 0.5–1.4)
CRP SERPL HS-MCNC: 2.57 MG/DL (ref 0–0.75)
EOSINOPHIL # BLD AUTO: 0.14 K/UL (ref 0–0.51)
EOSINOPHIL NFR BLD: 1.5 % (ref 0–6.9)
ERYTHROCYTE [DISTWIDTH] IN BLOOD BY AUTOMATED COUNT: 39.7 FL (ref 35.9–50)
ERYTHROCYTE [SEDIMENTATION RATE] IN BLOOD BY WESTERGREN METHOD: 6 MM/HOUR (ref 0–15)
GLUCOSE SERPL-MCNC: 106 MG/DL (ref 65–99)
GRAM STN SPEC: NORMAL
HCT VFR BLD AUTO: 48.4 % (ref 42–52)
HGB BLD-MCNC: 16.4 G/DL (ref 14–18)
IMM GRANULOCYTES # BLD AUTO: 0.03 K/UL (ref 0–0.11)
IMM GRANULOCYTES NFR BLD AUTO: 0.3 % (ref 0–0.9)
LYMPHOCYTES # BLD AUTO: 1.74 K/UL (ref 1–4.8)
LYMPHOCYTES NFR BLD: 18.5 % (ref 22–41)
MCH RBC QN AUTO: 29.8 PG (ref 27–33)
MCHC RBC AUTO-ENTMCNC: 33.9 G/DL (ref 33.7–35.3)
MCV RBC AUTO: 88 FL (ref 81.4–97.8)
MONOCYTES # BLD AUTO: 0.63 K/UL (ref 0–0.85)
MONOCYTES NFR BLD AUTO: 6.7 % (ref 0–13.4)
NEUTROPHILS # BLD AUTO: 6.83 K/UL (ref 1.82–7.42)
NEUTROPHILS NFR BLD: 72.7 % (ref 44–72)
NRBC # BLD AUTO: 0 K/UL
NRBC BLD-RTO: 0 /100 WBC
PLATELET # BLD AUTO: 182 K/UL (ref 164–446)
PMV BLD AUTO: 10.3 FL (ref 9–12.9)
POTASSIUM SERPL-SCNC: 3.6 MMOL/L (ref 3.6–5.5)
RBC # BLD AUTO: 5.5 M/UL (ref 4.7–6.1)
SIGNIFICANT IND 70042: NORMAL
SITE SITE: NORMAL
SODIUM SERPL-SCNC: 137 MMOL/L (ref 135–145)
SOURCE SOURCE: NORMAL
VANCOMYCIN TROUGH SERPL-MCNC: 14.3 UG/ML (ref 10–20)
WBC # BLD AUTO: 9.4 K/UL (ref 4.8–10.8)

## 2020-06-06 PROCEDURE — 85025 COMPLETE CBC W/AUTO DIFF WBC: CPT

## 2020-06-06 PROCEDURE — 99232 SBSQ HOSP IP/OBS MODERATE 35: CPT | Performed by: INTERNAL MEDICINE

## 2020-06-06 PROCEDURE — 87147 CULTURE TYPE IMMUNOLOGIC: CPT

## 2020-06-06 PROCEDURE — 87186 SC STD MICRODIL/AGAR DIL: CPT

## 2020-06-06 PROCEDURE — 36415 COLL VENOUS BLD VENIPUNCTURE: CPT

## 2020-06-06 PROCEDURE — 87205 SMEAR GRAM STAIN: CPT

## 2020-06-06 PROCEDURE — 85652 RBC SED RATE AUTOMATED: CPT

## 2020-06-06 PROCEDURE — 700117 HCHG RX CONTRAST REV CODE 255: Performed by: INTERNAL MEDICINE

## 2020-06-06 PROCEDURE — 770006 HCHG ROOM/CARE - MED/SURG/GYN SEMI*

## 2020-06-06 PROCEDURE — 700111 HCHG RX REV CODE 636 W/ 250 OVERRIDE (IP): Performed by: HOSPITALIST

## 2020-06-06 PROCEDURE — 87070 CULTURE OTHR SPECIMN AEROBIC: CPT

## 2020-06-06 PROCEDURE — 700111 HCHG RX REV CODE 636 W/ 250 OVERRIDE (IP): Performed by: INTERNAL MEDICINE

## 2020-06-06 PROCEDURE — 86140 C-REACTIVE PROTEIN: CPT

## 2020-06-06 PROCEDURE — 700105 HCHG RX REV CODE 258: Performed by: HOSPITALIST

## 2020-06-06 PROCEDURE — 700105 HCHG RX REV CODE 258: Performed by: INTERNAL MEDICINE

## 2020-06-06 PROCEDURE — 80202 ASSAY OF VANCOMYCIN: CPT

## 2020-06-06 PROCEDURE — 73201 CT UPPER EXTREMITY W/DYE: CPT | Mod: LT

## 2020-06-06 PROCEDURE — 80048 BASIC METABOLIC PNL TOTAL CA: CPT

## 2020-06-06 RX ORDER — KETOROLAC TROMETHAMINE 30 MG/ML
30 INJECTION, SOLUTION INTRAMUSCULAR; INTRAVENOUS EVERY 6 HOURS PRN
Status: DISCONTINUED | OUTPATIENT
Start: 2020-06-06 | End: 2020-06-06

## 2020-06-06 RX ADMIN — VANCOMYCIN HYDROCHLORIDE 1400 MG: 500 INJECTION, POWDER, LYOPHILIZED, FOR SOLUTION INTRAVENOUS at 02:06

## 2020-06-06 RX ADMIN — VANCOMYCIN HYDROCHLORIDE 1400 MG: 500 INJECTION, POWDER, LYOPHILIZED, FOR SOLUTION INTRAVENOUS at 10:23

## 2020-06-06 RX ADMIN — VANCOMYCIN HYDROCHLORIDE 1400 MG: 500 INJECTION, POWDER, LYOPHILIZED, FOR SOLUTION INTRAVENOUS at 18:06

## 2020-06-06 RX ADMIN — IOHEXOL 100 ML: 350 INJECTION, SOLUTION INTRAVENOUS at 10:16

## 2020-06-06 NOTE — CARE PLAN
Problem: Infection  Goal: Will remain free from infection  Outcome: PROGRESSING AS EXPECTED  Intervention: Implement standard precautions and perform hand washing before and after patient contact  Note: Standard precaution performed for before and after all pt contact     Problem: Pain Management  Goal: Pain level will decrease to patient's comfort goal  Outcome: PROGRESSING AS EXPECTED  Intervention: Follow pain managment plan developed in collaboration with patient and Interdisciplinary Team  Note: Pt assessed for pain regularly. Educated to call for pain.

## 2020-06-06 NOTE — PROGRESS NOTES
Assumed care of pt. Pt sitting up in bed. A&Ox4. On RA with no signs of distress. Patient reports a tolerable pain level of 4/10 on his left elbow. Declines intervention for pain. POC discussed with pt. Wound culture placed in per MD, collected BY graduate nurse and tubed to lab by charge RN. All comfort measures and safety precautions in place. Hourly rounding in place.

## 2020-06-06 NOTE — PROGRESS NOTES
[facility] Medicine Daily Progress Note    Date of Service  6/6/2020    Chief Complaint  Left elbow pain, redness and swelling    Hospital Course    30 y.o. male with no chronic medical issues, admitted 6/5/2020 with swelling, redness and pain on the left elbow which started about 4 days ago, and slowly but progressively getting worse.  He was started on Keflex by an urgent care, however he has not seen any improvement.  Initial work-up showed WBC of 12,600, with unimpressive BMP.  X-ray showed no evidence of fracture or dislocation of the left elbow.  He was felt to have cellulitis, and started on IV vancomycin.        Interval Problem Update  6/6/2020 - I reviewed the patient's chart. There were no significant overnight events. Remains hemodynamically stable and afebrile. Stable on RA.  WBC has normalized.  Blood cultures remain negative.    > I have personally seen and examined the patient today.  He still has pain on the left elbow, rated 5 out of 10 in severity, although he is able to move his elbow better today.  Left elbow still red.  No other complaints.  No fevers or chills.  No nausea or vomiting.  No chest pain or shortness of breath.  No diarrhea.      Consultants/Specialty  none    Code Status  Full    Disposition  Monitor on the medical floors.    Review of Systems  ROS     Pertinent positives/negatives as mentioned above.     A complete review of systems was personally done by me. All other systems were negative.       Physical Exam  Temp:  [36.7 °C (98 °F)-37.2 °C (99 °F)] 37.1 °C (98.7 °F)  Pulse:  [] 94  Resp:  [16-18] 18  BP: (115-136)/(72-88) 129/81  SpO2:  [94 %-98 %] 94 %    Physical Exam  Vitals signs reviewed.   Constitutional:       General: He is not in acute distress.     Appearance: Normal appearance. He is not toxic-appearing or diaphoretic.   HENT:      Head: Normocephalic and atraumatic.      Right Ear: External ear normal.      Left Ear: External ear normal.      Mouth/Throat:       Mouth: Mucous membranes are moist.      Pharynx: No oropharyngeal exudate.   Eyes:      General: No scleral icterus.     Extraocular Movements: Extraocular movements intact.      Conjunctiva/sclera: Conjunctivae normal.      Pupils: Pupils are equal, round, and reactive to light.   Neck:      Musculoskeletal: Normal range of motion and neck supple.   Cardiovascular:      Rate and Rhythm: Normal rate and regular rhythm.      Heart sounds: Normal heart sounds. No murmur. No gallop.    Pulmonary:      Effort: Pulmonary effort is normal. No respiratory distress.      Breath sounds: Normal breath sounds. No stridor. No wheezing, rhonchi or rales.   Chest:      Chest wall: No tenderness.   Abdominal:      General: Bowel sounds are normal. There is no distension.      Palpations: Abdomen is soft. There is no mass.      Tenderness: There is no abdominal tenderness. There is no guarding or rebound.   Musculoskeletal:      Comments: Swelling on the left elbow, with fluctuant area overlying the elbow with purulent discharge.  Erythema surrounding the elbow up to the upper arm, and forearm.  Tenderness on palpation.  Modest limitation in range of motion of the left elbow due to pain.   Lymphadenopathy:      Cervical: No cervical adenopathy.   Skin:     General: Skin is warm and dry.      Coloration: Skin is not jaundiced.      Findings: No rash.   Neurological:      General: No focal deficit present.      Mental Status: He is alert and oriented to person, place, and time.      Cranial Nerves: No cranial nerve deficit.   Psychiatric:         Mood and Affect: Mood normal.         Behavior: Behavior normal.         Thought Content: Thought content normal.         Judgment: Judgment normal.              Fluids    Intake/Output Summary (Last 24 hours) at 6/6/2020 0832  Last data filed at 6/5/2020 2000  Gross per 24 hour   Intake 800 ml   Output --   Net 800 ml       Laboratory  Recent Labs     06/05/20  0956   WBC 12.6*   RBC 6.04    HEMOGLOBIN 17.8   HEMATOCRIT 52.0   MCV 86.1   MCH 29.5   MCHC 34.2   RDW 38.7   PLATELETCT 195   MPV 10.0     Recent Labs     06/05/20  0956   SODIUM 138   POTASSIUM 4.1   CHLORIDE 104   CO2 23   GLUCOSE 103*   BUN 18   CREATININE 0.89   CALCIUM 9.5                   Imaging  CT-ELBOW WITH LEFT   Final Result      1.  Cellulitis within the dorsal medial soft tissues of the arm with a small 1.4 x 1 x 1.3 cm abscess in the dorsal elbow subcutaneous tissues.   2.  No acute osseous abnormality.      DX-ELBOW-COMPLETE 3+ LEFT   Final Result      No evidence of fracture or dislocation.           Assessment/Plan  * Cellulitis of left upper extremity- (present on admission)  Assessment & Plan  -May also have underlying bursitis.  Has purulent discharge.  Failed outpatient Keflex.  - x-ray showed no evidence of fracture or dislocation.  WBC has rapidly normalized.  -Continue IV vancomycin.  Check ESR, and CRP.  I will obtain a CT scan of the elbow to rule out underlying abscess.  Send for Gram stain and culture of purulence.  -Continue to monitor clinically for improvement with antibiotic therapy.  -Continue analgesics with oxycodone, and ibuprofen as needed..       VTE prophylaxis: SCD

## 2020-06-06 NOTE — PROGRESS NOTES
Pt arrived at floor at almost 13:00. Vitals stable.  A/Ox4, discussed plan of care. Pt on room air.       All needs met at this time. Bed in lowest position, treaded socks on, personal belongings and call light within reach, instructed to call for any assistance, hourly rounding in place.

## 2020-06-06 NOTE — PROGRESS NOTES
"Pharmacy Kinetics 30 y.o. male on vancomycin day # 2    2020    Currently on Vancomycin 1400 mg iv q8hr ([id number])  Provider specified end date: TBD    Indication for Treatment: SSTI    Pertinent history per medical record: Admitted on 2020 for Sepsis. 31 y/o M presented 2020 with swelling and redness about the left elbow.  Symptoms began about 4 days ago, and have been slowly getting worse.  He was seen at an urgent care and started on Keflex, and has been taking for last 48 hours , has not gotten better.      Other antibiotics: None     Allergies: Patient has no known allergies.     List concerns for renal function:  Obesity BMI >33,     Pertinent cultures to date:   20:PBCx1:NGTD    MRSA nares swab if pneumonia is a concern (ordered/positive/negative/n-a): NA    Recent Labs     20  0956   WBC 12.6*   NEUTSPOLYS 77.70*     Recent Labs     20  0956   BUN 18   CREATININE 0.89   ALBUMIN 4.4     No results for input(s): VANCOTROUGH, VANCOPEAK, VANCORANDOM in the last 72 hours.    Intake/Output Summary (Last 24 hours) at 2020 1030  Last data filed at 2020  Gross per 24 hour   Intake 700 ml   Output --   Net 700 ml      /81   Pulse 94   Temp 37.1 °C (98.7 °F) (Temporal)   Resp 18   Ht 1.854 m (6' 1\")   Wt 115 kg (253 lb 8.5 oz)   SpO2 94%  Temp (24hrs), Av.9 °C (98.5 °F), Min:36.7 °C (98 °F), Max:37.2 °C (99 °F)      A/P   1. Vancomycin dose change: No change   2. Next vancomycin level: 20@1800  3. Goal trough: 12-16mcg/mL   4. Comments:  Leukocytosis. Afebrile , cx NGTD. Trending renal indices , BMP ordered daily. Consider abx de escalation when clinically warranted.     Apolinar Bruce PharmD BCPS   "

## 2020-06-07 LAB
ANION GAP SERPL CALC-SCNC: 10 MMOL/L (ref 7–16)
BASOPHILS # BLD AUTO: 0.5 % (ref 0–1.8)
BASOPHILS # BLD: 0.04 K/UL (ref 0–0.12)
BUN SERPL-MCNC: 15 MG/DL (ref 8–22)
CALCIUM SERPL-MCNC: 9.5 MG/DL (ref 8.5–10.5)
CHLORIDE SERPL-SCNC: 106 MMOL/L (ref 96–112)
CO2 SERPL-SCNC: 22 MMOL/L (ref 20–33)
CREAT SERPL-MCNC: 0.86 MG/DL (ref 0.5–1.4)
EOSINOPHIL # BLD AUTO: 0.16 K/UL (ref 0–0.51)
EOSINOPHIL NFR BLD: 1.9 % (ref 0–6.9)
ERYTHROCYTE [DISTWIDTH] IN BLOOD BY AUTOMATED COUNT: 38.9 FL (ref 35.9–50)
GLUCOSE SERPL-MCNC: 113 MG/DL (ref 65–99)
HCT VFR BLD AUTO: 50 % (ref 42–52)
HGB BLD-MCNC: 16.8 G/DL (ref 14–18)
IMM GRANULOCYTES # BLD AUTO: 0.02 K/UL (ref 0–0.11)
IMM GRANULOCYTES NFR BLD AUTO: 0.2 % (ref 0–0.9)
LYMPHOCYTES # BLD AUTO: 1.85 K/UL (ref 1–4.8)
LYMPHOCYTES NFR BLD: 21.9 % (ref 22–41)
MCH RBC QN AUTO: 29.3 PG (ref 27–33)
MCHC RBC AUTO-ENTMCNC: 33.6 G/DL (ref 33.7–35.3)
MCV RBC AUTO: 87.3 FL (ref 81.4–97.8)
MONOCYTES # BLD AUTO: 0.61 K/UL (ref 0–0.85)
MONOCYTES NFR BLD AUTO: 7.2 % (ref 0–13.4)
NEUTROPHILS # BLD AUTO: 5.78 K/UL (ref 1.82–7.42)
NEUTROPHILS NFR BLD: 68.3 % (ref 44–72)
NRBC # BLD AUTO: 0 K/UL
NRBC BLD-RTO: 0 /100 WBC
PLATELET # BLD AUTO: 188 K/UL (ref 164–446)
PMV BLD AUTO: 10.2 FL (ref 9–12.9)
POTASSIUM SERPL-SCNC: 4.3 MMOL/L (ref 3.6–5.5)
RBC # BLD AUTO: 5.73 M/UL (ref 4.7–6.1)
SODIUM SERPL-SCNC: 138 MMOL/L (ref 135–145)
WBC # BLD AUTO: 8.5 K/UL (ref 4.8–10.8)

## 2020-06-07 PROCEDURE — 80048 BASIC METABOLIC PNL TOTAL CA: CPT

## 2020-06-07 PROCEDURE — 700111 HCHG RX REV CODE 636 W/ 250 OVERRIDE (IP): Performed by: INTERNAL MEDICINE

## 2020-06-07 PROCEDURE — 770006 HCHG ROOM/CARE - MED/SURG/GYN SEMI*

## 2020-06-07 PROCEDURE — 700111 HCHG RX REV CODE 636 W/ 250 OVERRIDE (IP): Performed by: HOSPITALIST

## 2020-06-07 PROCEDURE — 99232 SBSQ HOSP IP/OBS MODERATE 35: CPT | Performed by: INTERNAL MEDICINE

## 2020-06-07 PROCEDURE — 85025 COMPLETE CBC W/AUTO DIFF WBC: CPT

## 2020-06-07 PROCEDURE — 700105 HCHG RX REV CODE 258: Performed by: INTERNAL MEDICINE

## 2020-06-07 PROCEDURE — 36415 COLL VENOUS BLD VENIPUNCTURE: CPT

## 2020-06-07 PROCEDURE — 700105 HCHG RX REV CODE 258: Performed by: HOSPITALIST

## 2020-06-07 RX ADMIN — VANCOMYCIN HYDROCHLORIDE 1400 MG: 500 INJECTION, POWDER, LYOPHILIZED, FOR SOLUTION INTRAVENOUS at 02:30

## 2020-06-07 RX ADMIN — VANCOMYCIN HYDROCHLORIDE 1400 MG: 500 INJECTION, POWDER, LYOPHILIZED, FOR SOLUTION INTRAVENOUS at 10:29

## 2020-06-07 RX ADMIN — VANCOMYCIN HYDROCHLORIDE 1400 MG: 500 INJECTION, POWDER, LYOPHILIZED, FOR SOLUTION INTRAVENOUS at 18:17

## 2020-06-07 NOTE — PROGRESS NOTES
Assumed care of pt. He is sitting up in bed, on room air with no signs of distress. He is A&Ox4. POC discussed with pt. Dressing changed performed to left elbow. All comfort measure and fall precautions in place. Hourly rounding in place.

## 2020-06-07 NOTE — PROGRESS NOTES
"Pharmacy Kinetics 30 y.o. male on vancomycin day # 3   2020    Currently on Vancomycin 1400 mg iv q8hr ([id number])  Provider specified end date: TBD     Indication for Treatment: SSTI     Pertinent history per medical record: Admitted on 2020 for Sepsis. 29 y/o M presented 2020 with swelling and redness about the left elbow.  Symptoms began about 4 days ago, and have been slowly getting worse.  He was seen at an urgent care and started on Keflex, and has been taking for last 48 hours , has not gotten better.       Other antibiotics: None      Allergies: Patient has no known allergies.      List concerns for renal function:  Obesity BMI >33,      Pertinent cultures to date:   20:PBCx1:NGTD  :Lt Elbow,WND:NGTD     MRSA nares swab if pneumonia is a concern (ordered/positive/negative/n-a): NA    Recent Labs     20  0956 20  1030 20  0042   WBC 12.6* 9.4 8.5   NEUTSPOLYS 77.70* 72.70* 68.30     Recent Labs     20  0956 20  1030 20  0042   BUN 18 16 15   CREATININE 0.89 0.82 0.86   ALBUMIN 4.4  --   --      Recent Labs     20  1758   VANCOTROUGH 14.3       Intake/Output Summary (Last 24 hours) at 2020 0957  Last data filed at 2020 0900  Gross per 24 hour   Intake 848 ml   Output --   Net 848 ml      /81   Pulse 86   Temp 36.7 °C (98 °F) (Temporal)   Resp 16   Ht 1.854 m (6' 1\")   Wt 115 kg (253 lb 8.5 oz)   SpO2 97%  Temp (24hrs), Av.7 °C (98 °F), Min:36.6 °C (97.9 °F), Max:36.7 °C (98.1 °F)      A/P        1. Vancomycin dose change: No change   2. Next vancomycin level: ~3 days   3. Goal trough: 12-16mcg/mL   4. Comments: No leukocytosis, afebrile , cx NGTD. Renal indices stable over interval, last level at goal. Consider abx de escalation when clinically warranted.      Apolinar KatD BCPS  "

## 2020-06-07 NOTE — PROGRESS NOTES
Received report from previous day shift nurse and assumed care. Assessment completed, POC discussed. Pt is A&OX4, denies pain or discomfort. Currently receiving IV abx. All needs met at this time. Safety precautions and hourly rounding in place.

## 2020-06-07 NOTE — CARE PLAN
Problem: Infection  Goal: Will remain free from infection  Outcome: PROGRESSING AS EXPECTED  Intervention: Assess signs and symptoms of infection  Note: All standard precaution performed for all pt care.        Problem: Communication  Goal: The ability to communicate needs accurately and effectively will improve  Outcome: PROGRESSING AS EXPECTED  Intervention: [place] patient and significant other/support system to call light to alert staff of needs  Note: Pt encouraged to voice feelings.

## 2020-06-07 NOTE — PROGRESS NOTES
[facility] Medicine Daily Progress Note    Date of Service  6/7/2020    Chief Complaint  Left elbow pain, redness and swelling    Hospital Course    30 y.o. male with no chronic medical issues, admitted 6/5/2020 with swelling, redness and pain on the left elbow which started about 4 days ago, and slowly but progressively getting worse.  He was started on Keflex by an urgent care, however he has not seen any improvement.  Initial work-up showed WBC of 12,600, with unimpressive BMP.  X-ray showed no evidence of fracture or dislocation of the left elbow.  He was felt to have cellulitis, and started on IV vancomycin.        Interval Problem Update  6/7/2020 - I reviewed the patient's chart today. Uneventful night. VSS. Afebrile. Saturating well on RA.  No leukocytosis.  Hemoglobin stable.  Electrolytes and renal function are normal.  CT of the elbow showed cellulitis within the dorsal medial soft tissues of the arm, with the small 1.4 x 1 x 1.3 cm abscess in the dorsal elbow subcutaneous tissues, with no osseous abnormality.  Blood cultures remain negative.  Left elbow wound Gram stain showed no organisms, with rare WBCs, pending cultures. CRP 2.57.    > I have personally seen and examined the patient today. Elbow swelling has improved. Redness has gone down and faded. Has minimal pain on the left elbow.  There is a small fluctuance, and I was able to express solid formed pus, and further pressure provided no further extraction of purulence.  He has no other complaints.  No chest pain, shortness of breath, nausea vomiting, abdominal pain, fevers or chills..        Consultants/Specialty  none    Code Status  Full    Disposition  Monitor on the medical floors.    Review of Systems  ROS     Pertinent positives/negatives as mentioned above.     A complete review of systems was personally done by me. All other systems were negative.       Physical Exam  Temp:  [36.6 °C (97.9 °F)-36.7 °C (98.1 °F)] 36.7 °C (98 °F)  Pulse:   [86-92] 86  Resp:  [16-18] 16  BP: (120-131)/(81-86) 120/81  SpO2:  [95 %-99 %] 97 %    Physical Exam  Vitals signs reviewed.   Constitutional:       General: He is not in acute distress.     Appearance: Normal appearance. He is not toxic-appearing or diaphoretic.   HENT:      Head: Normocephalic and atraumatic.      Right Ear: External ear normal.      Left Ear: External ear normal.      Mouth/Throat:      Mouth: Mucous membranes are moist.      Pharynx: No oropharyngeal exudate.   Eyes:      General: No scleral icterus.     Extraocular Movements: Extraocular movements intact.      Conjunctiva/sclera: Conjunctivae normal.      Pupils: Pupils are equal, round, and reactive to light.   Neck:      Musculoskeletal: Normal range of motion and neck supple.   Cardiovascular:      Rate and Rhythm: Normal rate and regular rhythm.      Heart sounds: Normal heart sounds. No murmur. No gallop.    Pulmonary:      Effort: Pulmonary effort is normal. No respiratory distress.      Breath sounds: Normal breath sounds. No stridor. No wheezing, rhonchi or rales.   Chest:      Chest wall: No tenderness.   Abdominal:      General: Bowel sounds are normal. There is no distension.      Palpations: Abdomen is soft. There is no mass.      Tenderness: There is no abdominal tenderness. There is no guarding or rebound.   Musculoskeletal:      Right lower leg: No edema.      Left lower leg: No edema.      Comments: Erythema on the left elbow/arm cast is significantly improved, and faded.  Small area of fluctuance, which blood pressure and was able to express small solidified pus, with no further extraction of purulence with continued pressure.  Very minimal tenderness.  Improved range of motion of the left elbow.   Lymphadenopathy:      Cervical: No cervical adenopathy.   Skin:     General: Skin is warm and dry.      Coloration: Skin is not jaundiced.      Findings: No rash.   Neurological:      General: No focal deficit present.      Mental  Status: He is alert and oriented to person, place, and time.      Cranial Nerves: No cranial nerve deficit.   Psychiatric:         Mood and Affect: Mood normal.         Behavior: Behavior normal.         Thought Content: Thought content normal.         Judgment: Judgment normal.              Fluids    Intake/Output Summary (Last 24 hours) at 6/7/2020 1056  Last data filed at 6/7/2020 0900  Gross per 24 hour   Intake 848 ml   Output --   Net 848 ml       Laboratory  Recent Labs     06/05/20  0956 06/06/20  1030 06/07/20  0042   WBC 12.6* 9.4 8.5   RBC 6.04 5.50 5.73   HEMOGLOBIN 17.8 16.4 16.8   HEMATOCRIT 52.0 48.4 50.0   MCV 86.1 88.0 87.3   MCH 29.5 29.8 29.3   MCHC 34.2 33.9 33.6*   RDW 38.7 39.7 38.9   PLATELETCT 195 182 188   MPV 10.0 10.3 10.2     Recent Labs     06/05/20  0956 06/06/20  1030 06/07/20  0042   SODIUM 138 137 138   POTASSIUM 4.1 3.6 4.3   CHLORIDE 104 106 106   CO2 23 19* 22   GLUCOSE 103* 106* 113*   BUN 18 16 15   CREATININE 0.89 0.82 0.86   CALCIUM 9.5 9.1 9.5                   Imaging  CT-ELBOW WITH LEFT   Final Result      1.  Cellulitis within the dorsal medial soft tissues of the arm with a small 1.4 x 1 x 1.3 cm abscess in the dorsal elbow subcutaneous tissues.   2.  No acute osseous abnormality.      DX-ELBOW-COMPLETE 3+ LEFT   Final Result      No evidence of fracture or dislocation.           Assessment/Plan  * Cellulitis of left upper extremity- (present on admission)  Assessment & Plan  -With small subcutaneous abscess.  I have manually expressed through firm pressure the solidified pus from the left elbow abscess, and suspect that abscess has been fully debrided. Failed outpatient Keflex.  WBC count remains normal.  -Continue IV vancomycin.  Trend CRP. Follow wound culture.  -Continue to monitor clinically for improvement with antibiotic therapy.  -Continue analgesics with oxycodone, and ibuprofen as needed..       VTE prophylaxis: SCD

## 2020-06-07 NOTE — PROGRESS NOTES
Alert and oriented x4. Offered fluids and snacks. Safety precautions in placed. Bed in lowest position. Upper side rails up. Treaded socks on. Reinforced the use of call light when needing assistance.

## 2020-06-07 NOTE — PROGRESS NOTES
Pharmacy Kinetics Addendum:    30 y.o. male on vancomycin day # 2 6/6/2020    Currently on Vancomycin 1400 mg iv q8hr  Provider specified end date: TBD    Indication for Treatment: SSTI    Recent Labs     06/05/20  0956 06/06/20  1030   BUN 18 16   CREATININE 0.89 0.82   ALBUMIN 4.4  --      Recent Labs     06/06/20  1758   VANCOTROUGH 14.3       A/P   1. Vancomycin dose change: No change indicated  2. Next vancomycin level: In 3-5 days if stable, earlier if change occurs in clinical status or renal function  3. Goal trough: 12-16 mcg/mL  4. Comments: Trough resulted within goal range - early in treatment course, anticipate small amount of ongoing accumulation.  Renal indices stable, urine output not being measured.  Continue current dosing.  Pharmacy to follow daily during therapy.    Beverly Matos, PharmD, BCCCP

## 2020-06-08 VITALS
HEART RATE: 61 BPM | RESPIRATION RATE: 17 BRPM | WEIGHT: 253.53 LBS | BODY MASS INDEX: 33.6 KG/M2 | TEMPERATURE: 98.4 F | DIASTOLIC BLOOD PRESSURE: 85 MMHG | OXYGEN SATURATION: 96 % | HEIGHT: 73 IN | SYSTOLIC BLOOD PRESSURE: 121 MMHG

## 2020-06-08 LAB
ANION GAP SERPL CALC-SCNC: 9 MMOL/L (ref 7–16)
BACTERIA WND AEROBE CULT: ABNORMAL
BACTERIA WND AEROBE CULT: ABNORMAL
BUN SERPL-MCNC: 16 MG/DL (ref 8–22)
CALCIUM SERPL-MCNC: 9.5 MG/DL (ref 8.5–10.5)
CHLORIDE SERPL-SCNC: 107 MMOL/L (ref 96–112)
CO2 SERPL-SCNC: 20 MMOL/L (ref 20–33)
CREAT SERPL-MCNC: 0.83 MG/DL (ref 0.5–1.4)
CRP SERPL HS-MCNC: 0.67 MG/DL (ref 0–0.75)
GLUCOSE SERPL-MCNC: 102 MG/DL (ref 65–99)
GRAM STN SPEC: ABNORMAL
POTASSIUM SERPL-SCNC: 4.3 MMOL/L (ref 3.6–5.5)
SIGNIFICANT IND 70042: ABNORMAL
SITE SITE: ABNORMAL
SODIUM SERPL-SCNC: 136 MMOL/L (ref 135–145)
SOURCE SOURCE: ABNORMAL

## 2020-06-08 PROCEDURE — 36415 COLL VENOUS BLD VENIPUNCTURE: CPT

## 2020-06-08 PROCEDURE — 86140 C-REACTIVE PROTEIN: CPT

## 2020-06-08 PROCEDURE — 99239 HOSP IP/OBS DSCHRG MGMT >30: CPT | Performed by: INTERNAL MEDICINE

## 2020-06-08 PROCEDURE — 700105 HCHG RX REV CODE 258: Performed by: INTERNAL MEDICINE

## 2020-06-08 PROCEDURE — 700111 HCHG RX REV CODE 636 W/ 250 OVERRIDE (IP): Performed by: INTERNAL MEDICINE

## 2020-06-08 PROCEDURE — 80048 BASIC METABOLIC PNL TOTAL CA: CPT

## 2020-06-08 RX ORDER — AMOXICILLIN AND CLAVULANATE POTASSIUM 875; 125 MG/1; MG/1
1 TABLET, FILM COATED ORAL EVERY 12 HOURS
Qty: 12 TAB | Refills: 0 | Status: SHIPPED | OUTPATIENT
Start: 2020-06-08 | End: 2020-06-14

## 2020-06-08 RX ORDER — AMOXICILLIN AND CLAVULANATE POTASSIUM 875; 125 MG/1; MG/1
1 TABLET, FILM COATED ORAL EVERY 12 HOURS
Status: DISCONTINUED | OUTPATIENT
Start: 2020-06-08 | End: 2020-06-08 | Stop reason: HOSPADM

## 2020-06-08 RX ADMIN — VANCOMYCIN HYDROCHLORIDE 1400 MG: 500 INJECTION, POWDER, LYOPHILIZED, FOR SOLUTION INTRAVENOUS at 03:02

## 2020-06-08 NOTE — DISCHARGE SUMMARY
Discharge Summary    CHIEF COMPLAINT ON ADMISSION  Chief Complaint   Patient presents with   • Elbow Pain     left x4d +redness +swelling   • Sent from Urgent Care       Reason for Admission  Sent By MD; Swollen Elbow     Admission Date  6/5/2020    CODE STATUS  Full Code    HPI & HOSPITAL COURSE  This is a 30 y.o. male with no chronic medical issues, admitted 6/5/2020 with swelling, redness and pain on the left elbow which started about 4 days prior to admission, and slowly but progressively getting worse.  He was started on Keflex by an urgent care; however he has not seen any improvement.  Initial work-up showed WBC of 12,600, with unimpressive BMP.  X-ray showed no evidence of fracture or dislocation of the left elbow.  He was felt to have cellulitis, and started on IV vancomycin. CT of the elbow showed cellulitis within the dorsal medial soft tissues of the arm, with the small 1.4 x 1 x 1.3 cm abscess in the dorsal elbow subcutaneous tissues, with no osseous abnormality.  Blood cultures remain negative. Left elbow wound Gram stain showed no organisms. The abscess was able to be manually extracted/debrided.  Thereafter, the cellulitis of the left elbow has significantly improved.  His CRP was initially elevated has normalized.  Wound culture did grow MSSA, and he was transitioned to Augmentin.  Otherwise he remained hemodynamically stable and afebrile.    I have personally seen and examined the patient on the day of discharge. With his clinical improvement, he was deemed ready to discharge from the hospital as he did not have any further hospitalization needs. Patient felt comfortable going home. The discharge plan was discussed with the patient, with which he was agreeable to.       Therefore, he is discharged in good and stable condition to home with close outpatient follow-up.    The patient met 2-midnight criteria for an inpatient stay at the time of discharge.    Discharge Date  6/8/2020      FOLLOW UP  ITEMS POST DISCHARGE  -He will be continued on 6 more days of oral Augmentin.  Continue to monitor for clinical improvement.  Follow-up with PCP.  - counseled to seek immediate medical attention, or return to the ED for recurrent or worsening symptoms.      DISCHARGE DIAGNOSES  Principal Problem:    Cellulitis of left upper extremity POA: Yes  Resolved Problems:    * No resolved hospital problems. *      FOLLOW UP  No future appointments.  No follow-up provider specified.    MEDICATIONS ON DISCHARGE     Medication List      START taking these medications      Instructions   amoxicillin-clavulanate 875-125 MG Tabs  Commonly known as:  AUGMENTIN   Take 1 Tab by mouth every 12 hours for 6 days.  Dose:  1 Tab        CONTINUE taking these medications      Instructions   ibuprofen 200 MG Tabs  Commonly known as:  MOTRIN   Take 800 mg by mouth every 6 hours as needed.  Dose:  800 mg        STOP taking these medications    cephALEXin 500 MG Caps  Commonly known as:  KEFLEX            Allergies  No Known Allergies    DIET  Orders Placed This Encounter   Procedures   • Diet Order Regular     Standing Status:   Standing     Number of Occurrences:   1     Order Specific Question:   Diet:     Answer:   Regular [1]       ACTIVITY  As tolerated.  Weight bearing as tolerated    CONSULTATIONS  None    PROCEDURES  None    LABORATORY  Lab Results   Component Value Date    SODIUM 136 06/08/2020    POTASSIUM 4.3 06/08/2020    CHLORIDE 107 06/08/2020    CO2 20 06/08/2020    GLUCOSE 102 (H) 06/08/2020    BUN 16 06/08/2020    CREATININE 0.83 06/08/2020        Lab Results   Component Value Date    WBC 8.5 06/07/2020    HEMOGLOBIN 16.8 06/07/2020    HEMATOCRIT 50.0 06/07/2020    PLATELETCT 188 06/07/2020        Total time of the discharge process = 40 minutes.

## 2020-06-08 NOTE — CARE PLAN
Problem: Infection  Goal: Will remain free from infection  Outcome: PROGRESSING AS EXPECTED  Patient completed IV antibiotics. Orders placed by MD for PO antibiotics to be started today. Will continue to monitor for changes or signs of worsening infection.      Problem: Discharge Barriers/Planning  Goal: Patient's continuum of care needs will be met  Outcome: PROGRESSING AS EXPECTED  Patient aware of possible d/c today.

## 2020-06-08 NOTE — PROGRESS NOTES
Pt discharged off unit with CNA escort. PIV removed. Discharge instructions provided with proper follow up in place. Changed dressing to L elbow and provided supplies for patient do to do so at home. All questions answered.

## 2020-06-08 NOTE — DISCHARGE INSTRUCTIONS
Discharge Instructions    Discharged to home by car with relative. Discharged via wheelchair, hospital escort: Yes.  Special equipment needed: Not Applicable    Be sure to schedule a follow-up appointment with your primary care doctor or any specialists as instructed.     Discharge Plan:   Diet Plan: Discussed  Activity Level: Discussed    I understand that a diet low in cholesterol, fat, and sodium is recommended for good health. Unless I have been given specific instructions below for another diet, I accept this instruction as my diet prescription.   Other diet: Regular    Special Instructions: None    · Is patient discharged on Warfarin / Coumadin?   No       Cellulitis, Adult  Introduction  Cellulitis is a skin infection. The infected area is usually red and sore. This condition occurs most often in the arms and lower legs. It is very important to get treated for this condition.  Follow these instructions at home:  · Take over-the-counter and prescription medicines only as told by your doctor.  · If you were prescribed an antibiotic medicine, take it as told by your doctor. Do not stop taking the antibiotic even if you start to feel better.  · Drink enough fluid to keep your pee (urine) clear or pale yellow.  · Do not touch or rub the infected area.  · Raise (elevate) the infected area above the level of your heart while you are sitting or lying down.  · Place warm or cold wet cloths (warm or cold compresses) on the infected area. Do this as told by your doctor.  · Keep all follow-up visits as told by your doctor. This is important. These visits let your doctor make sure your infection is not getting worse.  Contact a doctor if:  · You have a fever.  · Your symptoms do not get better after 1-2 days of treatment.  · Your bone or joint under the infected area starts to hurt after the skin has healed.  · Your infection comes back. This can happen in the same area or another area.  · You have a swollen bump in the  infected area.  · You have new symptoms.  · You feel ill and also have muscle aches and pains.  Get help right away if:  · Your symptoms get worse.  · You feel very sleepy.  · You throw up (vomit) or have watery poop (diarrhea) for a long time.  · There are red streaks coming from the infected area.  · Your red area gets larger.  · Your red area turns darker.  This information is not intended to replace advice given to you by your health care provider. Make sure you discuss any questions you have with your health care provider.  Document Released: 06/05/2009 Document Revised: 05/25/2017 Document Reviewed: 10/26/2016  © 2017 Elsevier      Depression / Suicide Risk    As you are discharged from this [facility] facility, it is important to learn how to keep safe from harming yourself.    Recognize the warning signs:  · Abrupt changes in personality, positive or negative- including increase in energy   · Giving away possessions  · Change in eating patterns- significant weight changes-  positive or negative  · Change in sleeping patterns- unable to sleep or sleeping all the time   · Unwillingness or inability to communicate  · Depression  · Unusual sadness, discouragement and loneliness  · Talk of wanting to die  · Neglect of personal appearance   · Rebelliousness- reckless behavior  · Withdrawal from people/activities they love  · Confusion- inability to concentrate     If you or a loved one observes any of these behaviors or has concerns about self-harm, here's what you can do:  · Talk about it- your feelings and reasons for harming yourself  · Remove any means that you might use to hurt yourself (examples: pills, rope, extension cords, firearm)  · Get professional help from the community (Mental Health, Substance Abuse, psychological counseling)  · Do not be alone:Call your Safe Contact- someone whom you trust who will be there for you.  · Call your local CRISIS HOTLINE 108-1558 or 453-255-4403  · Call your local  Children's Mobile Crisis Response Team [place] (368) 512-0137 or www.[employer]  · Call the toll free National Suicide Prevention Hotlines   · National Suicide Prevention Lifeline 162-308-CFQX (0651)  · National Hope Line Network 800-SUICIDE (242-1102)

## 2020-06-08 NOTE — PROGRESS NOTES
A&Ox4. No c/o pain. L elbow dressing changed per pt's request; stated that it was itchy. Small amount of serious drainage. Drink provided. All needs currently met.

## 2020-06-08 NOTE — CARE PLAN
Problem: Safety  Goal: Will remain free from injury  Outcome: PROGRESSING AS EXPECTED       Problem: Knowledge Deficit  Goal: Knowledge of disease process/condition, treatment plan, diagnostic tests, and medications will improve  Outcome: PROGRESSING AS EXPECTED

## 2020-06-08 NOTE — PROGRESS NOTES
Received report from night shift and assumed care. Assessment completed, POC discussed. Pt is A&OX4, denies pain or discomfort. Currently sitting up in bed. Pt completed IV Vancomycin, states he would like to d/c home today. All other needs met. Safety precautions and hourly rounding in place.

## 2020-06-10 LAB
BACTERIA BLD CULT: NORMAL
SIGNIFICANT IND 70042: NORMAL
SITE SITE: NORMAL
SOURCE SOURCE: NORMAL

## 2020-06-11 ENCOUNTER — TELEPHONE (OUTPATIENT)
Dept: MEDICAL GROUP | Facility: PHYSICIAN GROUP | Age: 30
End: 2020-06-11

## 2020-06-11 ENCOUNTER — APPOINTMENT (OUTPATIENT)
Dept: MEDICAL GROUP | Facility: LAB | Age: 30
End: 2020-06-11
Payer: COMMERCIAL

## 2020-06-18 ENCOUNTER — HOSPITAL ENCOUNTER (OUTPATIENT)
Dept: LAB | Facility: MEDICAL CENTER | Age: 30
End: 2020-06-18
Attending: NURSE PRACTITIONER
Payer: COMMERCIAL

## 2020-06-18 ENCOUNTER — OFFICE VISIT (OUTPATIENT)
Dept: MEDICAL GROUP | Facility: PHYSICIAN GROUP | Age: 30
End: 2020-06-18
Payer: COMMERCIAL

## 2020-06-18 VITALS
TEMPERATURE: 98.7 F | WEIGHT: 261 LBS | HEIGHT: 73 IN | BODY MASS INDEX: 34.59 KG/M2 | OXYGEN SATURATION: 98 % | HEART RATE: 87 BPM | SYSTOLIC BLOOD PRESSURE: 120 MMHG | DIASTOLIC BLOOD PRESSURE: 70 MMHG

## 2020-06-18 DIAGNOSIS — Z13.21 ENCOUNTER FOR VITAMIN DEFICIENCY SCREENING: ICD-10-CM

## 2020-06-18 DIAGNOSIS — Z13.228 SCREENING FOR METABOLIC DISORDER: ICD-10-CM

## 2020-06-18 DIAGNOSIS — Z11.3 ROUTINE SCREENING FOR STI (SEXUALLY TRANSMITTED INFECTION): ICD-10-CM

## 2020-06-18 DIAGNOSIS — Z23 NEED FOR VACCINATION: ICD-10-CM

## 2020-06-18 DIAGNOSIS — G89.29 CHRONIC PAIN OF RIGHT KNEE: ICD-10-CM

## 2020-06-18 DIAGNOSIS — M25.561 CHRONIC PAIN OF RIGHT KNEE: ICD-10-CM

## 2020-06-18 DIAGNOSIS — Z00.00 ROUTINE ADULT HEALTH MAINTENANCE: ICD-10-CM

## 2020-06-18 DIAGNOSIS — Z13.6 SCREENING FOR CARDIOVASCULAR CONDITION: ICD-10-CM

## 2020-06-18 DIAGNOSIS — L03.114 CELLULITIS OF LEFT UPPER EXTREMITY: ICD-10-CM

## 2020-06-18 LAB
25(OH)D3 SERPL-MCNC: 21 NG/ML (ref 30–100)
ALBUMIN SERPL BCP-MCNC: 4.5 G/DL (ref 3.2–4.9)
ALBUMIN/GLOB SERPL: 1.4 G/DL
ALP SERPL-CCNC: 65 U/L (ref 30–99)
ALT SERPL-CCNC: 77 U/L (ref 2–50)
ANION GAP SERPL CALC-SCNC: 14 MMOL/L (ref 7–16)
AST SERPL-CCNC: 42 U/L (ref 12–45)
BASOPHILS # BLD AUTO: 0.6 % (ref 0–1.8)
BASOPHILS # BLD: 0.05 K/UL (ref 0–0.12)
BILIRUB SERPL-MCNC: 0.3 MG/DL (ref 0.1–1.5)
BUN SERPL-MCNC: 16 MG/DL (ref 8–22)
C TRACH DNA SPEC QL NAA+PROBE: NEGATIVE
CALCIUM SERPL-MCNC: 9.9 MG/DL (ref 8.5–10.5)
CHLORIDE SERPL-SCNC: 103 MMOL/L (ref 96–112)
CHOLEST SERPL-MCNC: 134 MG/DL (ref 100–199)
CO2 SERPL-SCNC: 21 MMOL/L (ref 20–33)
CREAT SERPL-MCNC: 0.88 MG/DL (ref 0.5–1.4)
EOSINOPHIL # BLD AUTO: 0.16 K/UL (ref 0–0.51)
EOSINOPHIL NFR BLD: 1.9 % (ref 0–6.9)
ERYTHROCYTE [DISTWIDTH] IN BLOOD BY AUTOMATED COUNT: 39.8 FL (ref 35.9–50)
EST. AVERAGE GLUCOSE BLD GHB EST-MCNC: 103 MG/DL
FASTING STATUS PATIENT QL REPORTED: NORMAL
GLOBULIN SER CALC-MCNC: 3.3 G/DL (ref 1.9–3.5)
GLUCOSE SERPL-MCNC: 94 MG/DL (ref 65–99)
HBA1C MFR BLD: 5.2 % (ref 0–5.6)
HCT VFR BLD AUTO: 55.4 % (ref 42–52)
HCV AB SER QL: NORMAL
HDLC SERPL-MCNC: 41 MG/DL
HGB BLD-MCNC: 18.3 G/DL (ref 14–18)
HIV 1+2 AB+HIV1 P24 AG SERPL QL IA: NORMAL
IMM GRANULOCYTES # BLD AUTO: 0.03 K/UL (ref 0–0.11)
IMM GRANULOCYTES NFR BLD AUTO: 0.4 % (ref 0–0.9)
LDLC SERPL CALC-MCNC: 68 MG/DL
LYMPHOCYTES # BLD AUTO: 2.31 K/UL (ref 1–4.8)
LYMPHOCYTES NFR BLD: 27.3 % (ref 22–41)
MCH RBC QN AUTO: 29.3 PG (ref 27–33)
MCHC RBC AUTO-ENTMCNC: 33 G/DL (ref 33.7–35.3)
MCV RBC AUTO: 88.6 FL (ref 81.4–97.8)
MONOCYTES # BLD AUTO: 0.47 K/UL (ref 0–0.85)
MONOCYTES NFR BLD AUTO: 5.6 % (ref 0–13.4)
N GONORRHOEA DNA SPEC QL NAA+PROBE: NEGATIVE
NEUTROPHILS # BLD AUTO: 5.44 K/UL (ref 1.82–7.42)
NEUTROPHILS NFR BLD: 64.2 % (ref 44–72)
NRBC # BLD AUTO: 0 K/UL
NRBC BLD-RTO: 0 /100 WBC
PLATELET # BLD AUTO: 194 K/UL (ref 164–446)
PMV BLD AUTO: 10.7 FL (ref 9–12.9)
POTASSIUM SERPL-SCNC: 4.5 MMOL/L (ref 3.6–5.5)
PROT SERPL-MCNC: 7.8 G/DL (ref 6–8.2)
RBC # BLD AUTO: 6.25 M/UL (ref 4.7–6.1)
SODIUM SERPL-SCNC: 138 MMOL/L (ref 135–145)
SPECIMEN SOURCE: NORMAL
TREPONEMA PALLIDUM IGG+IGM AB [PRESENCE] IN SERUM OR PLASMA BY IMMUNOASSAY: NORMAL
TRIGL SERPL-MCNC: 123 MG/DL (ref 0–149)
WBC # BLD AUTO: 8.5 K/UL (ref 4.8–10.8)

## 2020-06-18 PROCEDURE — 86780 TREPONEMA PALLIDUM: CPT

## 2020-06-18 PROCEDURE — 87389 HIV-1 AG W/HIV-1&-2 AB AG IA: CPT

## 2020-06-18 PROCEDURE — 80061 LIPID PANEL: CPT

## 2020-06-18 PROCEDURE — 99214 OFFICE O/P EST MOD 30 MIN: CPT | Mod: 25 | Performed by: NURSE PRACTITIONER

## 2020-06-18 PROCEDURE — 82306 VITAMIN D 25 HYDROXY: CPT

## 2020-06-18 PROCEDURE — 80053 COMPREHEN METABOLIC PANEL: CPT

## 2020-06-18 PROCEDURE — 90472 IMMUNIZATION ADMIN EACH ADD: CPT | Performed by: NURSE PRACTITIONER

## 2020-06-18 PROCEDURE — 36415 COLL VENOUS BLD VENIPUNCTURE: CPT

## 2020-06-18 PROCEDURE — 90632 HEPA VACCINE ADULT IM: CPT | Performed by: NURSE PRACTITIONER

## 2020-06-18 PROCEDURE — 87491 CHLMYD TRACH DNA AMP PROBE: CPT

## 2020-06-18 PROCEDURE — 87591 N.GONORRHOEAE DNA AMP PROB: CPT

## 2020-06-18 PROCEDURE — 90715 TDAP VACCINE 7 YRS/> IM: CPT | Performed by: NURSE PRACTITIONER

## 2020-06-18 PROCEDURE — 85025 COMPLETE CBC W/AUTO DIFF WBC: CPT

## 2020-06-18 PROCEDURE — 86803 HEPATITIS C AB TEST: CPT

## 2020-06-18 PROCEDURE — 90471 IMMUNIZATION ADMIN: CPT | Performed by: NURSE PRACTITIONER

## 2020-06-18 PROCEDURE — 83036 HEMOGLOBIN GLYCOSYLATED A1C: CPT

## 2020-06-18 SDOH — HEALTH STABILITY: MENTAL HEALTH: HOW OFTEN DO YOU HAVE A DRINK CONTAINING ALCOHOL?: MONTHLY OR LESS

## 2020-06-18 ASSESSMENT — FIBROSIS 4 INDEX: FIB4 SCORE: 0.61

## 2020-06-18 ASSESSMENT — PATIENT HEALTH QUESTIONNAIRE - PHQ9: CLINICAL INTERPRETATION OF PHQ2 SCORE: 0

## 2020-06-18 NOTE — PROGRESS NOTES
Chief Complaint   Patient presents with   • Establish Care   • Hospital Follow-up         Subjective:     Cyrus Zuniga is a 30 y.o. male presenting with hospital follow-up.    Patient was initially seen in urgent care on 6/3/2020 then again on 6/5/2020 with progressively worsening redness, swelling, pain of the left upper extremity.  He had been started on oral Keflex and after 2 days it had not improved, was sent to the emergency room.    Hospital stay extended from 6/5/2022 6/8/2020.  CT was done that showed cellulitis within dorsal medial soft tissues of the arm with a small 1.4 X1 X1 0.3 cm abscess at the dorsal elbow subcutaneous tissues, no osseous abnormality.  Blood cultures were drawn and negative.  Gram stain was taken which was negative.  Wound cultures MSSA, patient placed on Augmentin.  He responded well to this antibiotic and has since completed prescription.    He feels he has recovered well, has some persistent redness of his upper extremity, but the swelling/tenderness/heat have dissipated.  He has remained afebrile, without body aches, excessive fatigue, chills.    He also needs to establish care.  Overall does not have significant medical or surgical history.  Does not have positive family history to his knowledge.    Non-smoker, rarely drinks.  Works in a correction.  Not yet established with eye doctor or dentist, but recently got insurance coverage so plans to do so.    Has had some persistent right knee pain for roughly 1 year.  This is worse when he applies pressure to the anterior surface of it, reports it is stabbing and severe.  He is able to bear weight and move around freely without difficulty or pain.  No specific injury or trauma that he can recall.  Has not required any kind of medication.  Has used external knee braces, but reports this usually worsens it because they put pressure on the outside of the joint.      Review of systems:      Denies chest pain, shortness of breath, sore  "throat, difficulty swallowing, new cough, dizziness, severe headache, altered cognition, changes in bowel or bladder habits, decreased sensation, decreased strength, numbness or tingling, intolerable depression or anxiety, changes in vision, fatigue, painful or swollen lymph nodes.     No current outpatient medications on file.    Allergies, past medical history, past surgical history, family history, social history reviewed and updated    Objective:     Vitals: /70 (BP Location: Right arm, Patient Position: Sitting, BP Cuff Size: Large adult)   Pulse 87   Temp 37.1 °C (98.7 °F) (Temporal)   Ht 1.854 m (6' 1\")   Wt 118.4 kg (261 lb)   SpO2 98%   BMI 34.43 kg/m²   General: Alert, cooperative, dressed appropriately for weather / situation  Eyes:Normocephalic.  EOMI, no icterus or pallor.  Conjunctivae clear without erythema / irritation.  ENT:  External ears developed; Bilat TMs visualized; appear pearly without bulging, effusion, or erythema; crisp light reflex.    Lymph: Neck supple, absent of cervical or supraclavicular lymphadenopathy.  Thyroid palpated, free of masses or goiter.   Heart: Regular rate and rhythm.  S1 and S2 normal.  No murmurs auscultated; no murmurs / bruits heard over bilateral carotids.  Bilateral radial pulses strong and equal.  Bilateral posterior tibial pulses strong and equal.  Respiratory: Normal respiratory effort.  Clear to auscultation bilaterally.  AP ratio 1:2   Abdomen: Non-distended;   Skin: Areas of mild erythema remaining on left upper extremity surrounding elbow.  Scant dry skin on elbow peeling.  Visible skin intact, dry, without rash.  Musculoskeletal: Gait is normal.  Bilateral  strength strong equal.  Moves extremities freely and equally bilaterally  Neuro:  AAOx3 Visual tracking intact, no nystagmus;   Psych:  Affect/mood is normal, judgement is good, memory is intact, grooming is appropriate.    Assessment/Plan:     Cyrus was seen today for establish care " and hospital follow-up.    Diagnoses and all orders for this visit:    Cellulitis of left upper extremity: Has completed antibiotics.  Will reevaluate labs to ensure no lingering elevated white count.  Fortunately patient is currently completely asymptomatic of systemic infection.  Tolerated antibiotics well.  Not experiencing any post antibiotic irritation such as diarrhea.  Advised him to keep the area clean and dry, monitoring for return of symptoms.  -     CBC WITH DIFFERENTIAL; Future  -     Comp Metabolic Panel; Future    Chronic pain of right knee: It is unclear what is causing this knee pain, referral placed to sports medicine if the patient would like to pursue further evaluation and care planning.  -     REFERRAL TO SPORTS MEDICINE    Need for vaccination: As patient works in the present population, hep A risk is increased, would benefit from completing the series.    Risk/benefits/alternatives reviewed, patient consented and tolerated well.    -     Tdap Vaccine =>8YO IM  -     Hepatitis A Vaccine Adult IM    Routine adult health maintenance  -     Hepatitis A Vaccine Adult IM  -     CBC WITH DIFFERENTIAL; Future  -     Comp Metabolic Panel; Future  -     Lipid Profile; Future  -     HEMOGLOBIN A1C; Future  -     VITAMIN D,25 HYDROXY; Future  -     HIV AG/AB COMBO ASSAY SCREENING; Future  -     HEP C VIRUS ANTIBODY; Future  -     T.PALLIDUM AB EIA; Future  -     Chlamydia/GC PCR Urine Or Swab; Future    Screening for cardiovascular condition  -     CBC WITH DIFFERENTIAL; Future  -     Lipid Profile; Future    Encounter for vitamin deficiency screening  -     CBC WITH DIFFERENTIAL; Future  -     Comp Metabolic Panel; Future  -     VITAMIN D,25 HYDROXY; Future    Screening for metabolic disorder  -     Comp Metabolic Panel; Future  -     HEMOGLOBIN A1C; Future    Routine screening for STI (sexually transmitted infection)  -     HIV AG/AB COMBO ASSAY SCREENING; Future  -     HEP C VIRUS ANTIBODY; Future  -      T.PALLIDUM AB EIA; Future  -     Chlamydia/GC PCR Urine Or Swab; Future    Patient to obtain fasting lab work, I will follow-up with him regarding these results via [employer].    He can return in a year for an annual physical, sooner if needed.    Return in about 1 year (around 6/18/2021).    Patient verbalized understanding and agreed to plan of care.  Encouraged to contact me with needs via [employer] or by phone if needed.      I have placed the above orders and discussed them with an approved delegating provider.  The MA is performing the below orders under the direction of Dr Sanchez.    Please note that this dictation was created using voice recognition software. I have made every reasonable attempt to correct obvious errors, but I expect that there are errors of grammar and possibly content that I did not discover before finalizing the note.

## 2020-06-22 DIAGNOSIS — E55.9 VITAMIN D DEFICIENCY: ICD-10-CM

## 2020-06-22 DIAGNOSIS — D75.1 POLYCYTHEMIA: ICD-10-CM

## 2020-06-22 NOTE — PROGRESS NOTES
Patient has elevated RBCs, hemoglobin, hematocrit.  Likely temporary due to recent infection.  Will recheck in 1 month.  Alerted via my chart to stop in any [facility] at 1 month and get this done.    Also vitamin D deficient, high-dose 12-week supplement prescribed then patient instructed to switch to daily once 12 weeks is complete.

## 2020-07-16 ENCOUNTER — OFFICE VISIT (OUTPATIENT)
Dept: MEDICAL GROUP | Facility: PHYSICIAN GROUP | Age: 30
End: 2020-07-16
Payer: COMMERCIAL

## 2020-07-16 VITALS
WEIGHT: 263 LBS | OXYGEN SATURATION: 97 % | HEIGHT: 73 IN | HEART RATE: 80 BPM | DIASTOLIC BLOOD PRESSURE: 70 MMHG | TEMPERATURE: 98.6 F | SYSTOLIC BLOOD PRESSURE: 118 MMHG | BODY MASS INDEX: 34.85 KG/M2

## 2020-07-16 DIAGNOSIS — L73.9 FOLLICULITIS OF LEFT AXILLA: ICD-10-CM

## 2020-07-16 DIAGNOSIS — L03.114 CELLULITIS OF LEFT UPPER EXTREMITY: ICD-10-CM

## 2020-07-16 PROCEDURE — 99214 OFFICE O/P EST MOD 30 MIN: CPT | Performed by: NURSE PRACTITIONER

## 2020-07-16 RX ORDER — SULFAMETHOXAZOLE AND TRIMETHOPRIM 800; 160 MG/1; MG/1
1 TABLET ORAL 2 TIMES DAILY
Qty: 14 TAB | Refills: 0 | Status: SHIPPED | OUTPATIENT
Start: 2020-07-16 | End: 2020-07-27

## 2020-07-16 RX ORDER — CHLORHEXIDINE GLUCONATE 4 G/100ML
SOLUTION TOPICAL
Qty: 240 ML | Refills: 1 | Status: SHIPPED | OUTPATIENT
Start: 2020-07-16 | End: 2021-04-23

## 2020-07-16 ASSESSMENT — FIBROSIS 4 INDEX: FIB4 SCORE: 0.72

## 2020-07-16 NOTE — PROGRESS NOTES
"Chief Complaint   Patient presents with   • Follow-Up         Subjective:     Cyrus Zuniga is a 30 y.o. male presenting with rash.    Patient experienced folliculitis in early June, despite antibiotic therapy this progressed requiring inpatient hospitalization.  During course of hospitalization, patient had an incision and drainage done of his left elbow and was put on IV antibiotics.  Discharged on Augmentin, wound cultures were positive for MSSA.    Over the past week, patient has noted a recurrence in similar folliculitis in his left underarm.  His roommate has also developed this.  He is concerned for recurring infection.  Denies fatigue, myalgias, fever, chills.  Does have multiple small pustular lesions in left axilla and left thorax.     He was informed upon release to the hospital that he may end up being a carrier of MRSA or have a potential exposure to it.      Review of systems:      Denies chest pain, shortness of breath, sore throat, difficulty swallowing, new cough, dizziness, severe headache, altered cognition, changes in bowel or bladder habits, decreased sensation, decreased strength, numbness or tingling, intolerable depression or anxiety, changes in vision, fatigue, myalgias, painful or swollen lymph nodes.       Current Outpatient Medications:   •  sulfamethoxazole-trimethoprim (BACTRIM DS) 800-160 MG tablet, Take 1 Tab by mouth 2 times a day., Disp: 14 Tab, Rfl: 0  •  Cholecalciferol 1.25 MG (06304 UT) Tab, Take 50,000 Units by mouth every 7 days., Disp: 12 Tab, Rfl: 0    Allergies, past medical history, past surgical history, family history, social history reviewed and updated    Objective:     Vitals: /70 (BP Location: Right arm, Patient Position: Sitting, BP Cuff Size: Large adult)   Pulse 80   Temp 37 °C (98.6 °F) (Temporal)   Ht 1.854 m (6' 1\")   Wt 119.3 kg (263 lb)   SpO2 97%   BMI 34.70 kg/m²   General: Alert, cooperative, dressed appropriately for weather / " situation  Eyes:Normocephalic.  EOMI, no icterus or pallor.  Conjunctivae clear without erythema / irritation.  ENT:  External ears developed;   Heart: Regular rate and rhythm.    Respiratory: Normal respiratory effort.    Abdomen: Rounded  Skin: Multiple small pustular lesions on the left axilla and left thorax.  No notable erythema, edema, induration surrounding.  All lesions are closed, no active drainage.  All of them are less than 3 mm in diameter.  Musculoskeletal: Gait is normal.   Neuro:  AAOx3 Visual tracking intact, no nystagmus;   Psych:  Affect/mood is normal, judgement is good, memory is intact, grooming is appropriate.    Assessment/Plan:     Cyrus was seen today for follow-up.    Diagnoses and all orders for this visit:    Folliculitis: Reviewed with patient that he may be a character of MRSA or have a source of it.  Because of this I would like to put him on a 7-day course of Bactrim DS.  I recommended taking probiotics and hydrating diligently to reduce infectious diarrhea and adverse effects of medication.    As patient and his roommate have contracted this, I recommend a deep cleaning of his house, stripping all linens and washing them on hot along with clothing.  Cleaning surfaces such as bathrooms and kitchen with bleach-containing solution.  Should wear clothing that does provide coverage to reduce skin contact with objects within the home.    Advised him that if these continue to worsen or fail to improve to return to the clinic.  If they enlarge, may require I&D.  Hopefully we can manage this outpatient.  Advised patient to continue to monitor for signs and symptoms of fever and worsening infection, if these occur, advised to visit emergency department as IV antibiotics may be needed.    -     sulfamethoxazole-trimethoprim (BACTRIM DS) 800-160 MG tablet; Take 1 Tab by mouth 2 times a day.    5-day course of daily Hibiclens rinse also prescribed.  Instructions provided on use and necessary  diligent rinsing reviewed it provided prescription.    Patient has a follow-up CBC ordered an appointment at the end of this month with me.    Return if symptoms worsen or fail to improve.    Patient verbalized understanding and agreed to plan of care.  Encouraged to contact me with needs via [employer] or by phone if needed.      I have placed the above orders and discussed them with an approved delegating provider.  The MA is performing the below orders under the direction of Dr Sanchez.    Please note that this dictation was created using voice recognition software. I have made every reasonable attempt to correct obvious errors, but I expect that there are errors of grammar and possibly content that I did not discover before finalizing the note.

## 2020-07-25 ENCOUNTER — HOSPITAL ENCOUNTER (OUTPATIENT)
Dept: LAB | Facility: MEDICAL CENTER | Age: 30
End: 2020-07-25
Attending: NURSE PRACTITIONER
Payer: COMMERCIAL

## 2020-07-25 DIAGNOSIS — D75.1 POLYCYTHEMIA: ICD-10-CM

## 2020-07-25 LAB
BASOPHILS # BLD AUTO: 0.4 % (ref 0–1.8)
BASOPHILS # BLD: 0.02 K/UL (ref 0–0.12)
EOSINOPHIL # BLD AUTO: 0.11 K/UL (ref 0–0.51)
EOSINOPHIL NFR BLD: 2 % (ref 0–6.9)
ERYTHROCYTE [DISTWIDTH] IN BLOOD BY AUTOMATED COUNT: 41.3 FL (ref 35.9–50)
HCT VFR BLD AUTO: 52.2 % (ref 42–52)
HGB BLD-MCNC: 17.1 G/DL (ref 14–18)
IMM GRANULOCYTES # BLD AUTO: 0 K/UL (ref 0–0.11)
IMM GRANULOCYTES NFR BLD AUTO: 0 % (ref 0–0.9)
LYMPHOCYTES # BLD AUTO: 2.61 K/UL (ref 1–4.8)
LYMPHOCYTES NFR BLD: 47.8 % (ref 22–41)
MCH RBC QN AUTO: 29 PG (ref 27–33)
MCHC RBC AUTO-ENTMCNC: 32.8 G/DL (ref 33.7–35.3)
MCV RBC AUTO: 88.5 FL (ref 81.4–97.8)
MONOCYTES # BLD AUTO: 0.4 K/UL (ref 0–0.85)
MONOCYTES NFR BLD AUTO: 7.3 % (ref 0–13.4)
NEUTROPHILS # BLD AUTO: 2.32 K/UL (ref 1.82–7.42)
NEUTROPHILS NFR BLD: 42.5 % (ref 44–72)
NRBC # BLD AUTO: 0 K/UL
NRBC BLD-RTO: 0 /100 WBC
PLATELET # BLD AUTO: 163 K/UL (ref 164–446)
PMV BLD AUTO: 10.4 FL (ref 9–12.9)
RBC # BLD AUTO: 5.9 M/UL (ref 4.7–6.1)
WBC # BLD AUTO: 5.5 K/UL (ref 4.8–10.8)

## 2020-07-25 PROCEDURE — 36415 COLL VENOUS BLD VENIPUNCTURE: CPT

## 2020-07-25 PROCEDURE — 85025 COMPLETE CBC W/AUTO DIFF WBC: CPT

## 2020-07-27 ENCOUNTER — OFFICE VISIT (OUTPATIENT)
Dept: MEDICAL GROUP | Facility: PHYSICIAN GROUP | Age: 30
End: 2020-07-27
Payer: COMMERCIAL

## 2020-07-27 VITALS
SYSTOLIC BLOOD PRESSURE: 118 MMHG | HEART RATE: 78 BPM | BODY MASS INDEX: 35.12 KG/M2 | DIASTOLIC BLOOD PRESSURE: 78 MMHG | WEIGHT: 265 LBS | HEIGHT: 73 IN | TEMPERATURE: 98.1 F | OXYGEN SATURATION: 96 %

## 2020-07-27 DIAGNOSIS — L73.9 FOLLICULITIS OF LEFT AXILLA: ICD-10-CM

## 2020-07-27 DIAGNOSIS — R09.81 CHRONIC NASAL CONGESTION: ICD-10-CM

## 2020-07-27 PROBLEM — L03.114 CELLULITIS OF LEFT UPPER EXTREMITY: Status: RESOLVED | Noted: 2020-06-05 | Resolved: 2020-07-27

## 2020-07-27 PROCEDURE — 99214 OFFICE O/P EST MOD 30 MIN: CPT | Performed by: NURSE PRACTITIONER

## 2020-07-27 ASSESSMENT — FIBROSIS 4 INDEX: FIB4 SCORE: 0.85

## 2020-07-27 NOTE — PATIENT INSTRUCTIONS
Use a Neti pot twice a day.  This can be found in the pharmacy section of most grocery stores.  They often come in a kit form with premixed powder solutions.  Mix these with previously boiled and cooled or distilled water.  Avoid using tap water if you are able to.  You can use 1 packet or 2 packets of the solution mix.  2 packets will help reduce the swelling in the tissues within your nose, but if you feel like your nose becomes excessively dry, only use 1 and use Neti pot only once daily for several days as your body adjusts.    Flonase (fluticasone) or Nasacort are both intranasal steroids  Astelin (azelastine)nose spray is an antihistamine    Use any nasal sprays after Neti pot.      Be sure to administer Flonase appropriately, bent forward and pointing the nozzle toward the ear.  Do not sniff and spray.    In general, avoid use of Afrin.  These decongestant nasal sprays cause a rebound congestion quite severely.

## 2020-07-27 NOTE — PROGRESS NOTES
Chief Complaint   Patient presents with   • Follow-Up   • Other     left arm redness x 2 days          Subjective:     Cyrus Zuniga is a 30 y.o. male presenting with nasal congestion.    Nasal congestion: Patient suffers from chronic nasal congestion.  He was previously using Afrin quite a bit as well as some over-the-counter antihistamines including Benadryl.  After discussion, I encouraged that he stop this due to risk of rhinitis medicamentosa.  Encouraged use of intranasal corticosteroid and daily use of nondrowsy antihistamine.  He has been doing this for 2 weeks and has not noticed much of a difference.  Reports he is using Nasacort as instructed as well as OTC Claritin.  Denies any headache, fever, chills.    Mild folliculitis: Patient has been experiencing mild folliculitis of bilateral axilla with history of cellulitis in the upper extremity.  Has previously been treated with OTC antibiotics as well as IV antibiotics when hospitalized.  Fortunately, this is reduced in severity dramatically.  He has not use the Hibiclens, but he does have it at home if needed.  He does have a small bug bite on his anterior left forearm, mild redness.  He has itched it.  No purulent drainage or streaking.      Review of systems:      Denies chest pain, shortness of breath, sore throat, difficulty swallowing, new cough, dizziness, severe headache, altered cognition, changes in bowel or bladder habits, decreased sensation, decreased strength, numbness or tingling, intolerable depression or anxiety, changes in vision, fatigue, myalgias, painful or swollen lymph nodes.       Current Outpatient Medications:   •  Cholecalciferol 1.25 MG (11737 UT) Tab, Take 50,000 Units by mouth every 7 days., Disp: 12 Tab, Rfl: 0  •  chlorhexidine (HIBICLENS) 4 % liquid, Rinse with water then apply min amount necessary to cover wound area and wash gently. Rinse again thoroughly. Repeat daily for 5 days (Patient not taking: Reported on 7/27/2020),  "Disp: 240 mL, Rfl: 1    Allergies, past medical history, past surgical history, family history, social history reviewed and updated    Objective:     Vitals: /78 (BP Location: Right arm, Patient Position: Sitting, BP Cuff Size: Large adult)   Pulse 78   Temp 36.7 °C (98.1 °F) (Temporal)   Ht 1.854 m (6' 1\")   Wt 120.2 kg (265 lb)   SpO2 96%   BMI 34.96 kg/m²   Constitutional: Alert, no distress, well-groomed.  Skin: Multiple small pinpoint occurrences of folliculitis in various stages of healing bilateral axilla.  1.5 cm in diameter area of erythema in anterior left forearm with small scab in center, no streaking or discharge, nonfluctuant.  Warm, dry, good turgor.  Eye: Equal, round and reactive, conjunctiva clear, lids normal.  ENMT: Lips without lesions, good dentition, moist mucous membranes.  Neck: Trachea midline, no masses, no thyromegaly.  Respiratory: Unlabored respiratory effort, no cough.  MSK: Normal gait, moves all extremities.  Neuro: Grossly non-focal.   Psych: Alert and oriented x3, normal affect and mood.    Assessment/Plan:     Cyrus was seen today for follow-up and other.    Diagnoses and all orders for this visit:    Chronic nasal congestion: Advised patient switch to a stronger OTC antihistamine such as cetirizine or fexofenadine.  Recommended he continue to use the intranasal corticosteroid as this is low dose and take several weeks to reduce edema.  Encouraged use of [place] pot and provided instructions on safe use concurrently with nasal sprays.  If these do not improve symptoms over the next month, advised he reach out to me via MyChart or phone call and I will place an ENT referral.    Folliculitis of left axilla: Fortunately, cellulitis has resolved and I am not concerned for recurrent infection.  Discussed preventative measures.  Patient does appear to have quite sensitive skin so I recommended he switch to a body wash at that does not contain a lot of artificial fragrance or " colors.  Consider one that has tea tree oil as it is a natural antimicrobial agent.  Recommended use of a clean washcloth for each shower.    If he begins to notice recurrent folliculitis, advised use of Hibiclens as prescribed.    Overall, he is in good health.  He can return in 1 year for routine physical and labs.  Sooner if needed.        Return in about 1 year (around 7/27/2021).    Patient verbalized understanding and agreed to plan of care.  Encouraged to contact me with needs via [employer] or by phone if needed.      I have placed the above orders and discussed them with an approved delegating provider.  The MA is performing the below orders under the direction of Dr Fields.    Please note that this dictation was created using voice recognition software. I have made every reasonable attempt to correct obvious errors, but I expect that there are errors of grammar and possibly content that I did not discover before finalizing the note.

## 2021-01-11 ENCOUNTER — APPOINTMENT (OUTPATIENT)
Dept: MEDICAL GROUP | Facility: PHYSICIAN GROUP | Age: 31
End: 2021-01-11
Payer: COMMERCIAL

## 2021-01-18 ENCOUNTER — APPOINTMENT (OUTPATIENT)
Dept: MEDICAL GROUP | Facility: PHYSICIAN GROUP | Age: 31
End: 2021-01-18
Payer: COMMERCIAL

## 2021-01-26 ENCOUNTER — APPOINTMENT (OUTPATIENT)
Dept: MEDICAL GROUP | Facility: PHYSICIAN GROUP | Age: 31
End: 2021-01-26
Payer: COMMERCIAL

## 2021-03-15 ENCOUNTER — APPOINTMENT (OUTPATIENT)
Dept: MEDICAL GROUP | Facility: PHYSICIAN GROUP | Age: 31
End: 2021-03-15
Payer: COMMERCIAL

## 2021-04-23 ENCOUNTER — OFFICE VISIT (OUTPATIENT)
Dept: MEDICAL GROUP | Facility: PHYSICIAN GROUP | Age: 31
End: 2021-04-23
Payer: COMMERCIAL

## 2021-04-23 VITALS
DIASTOLIC BLOOD PRESSURE: 70 MMHG | TEMPERATURE: 98.9 F | HEART RATE: 85 BPM | BODY MASS INDEX: 35.39 KG/M2 | WEIGHT: 267 LBS | SYSTOLIC BLOOD PRESSURE: 118 MMHG | OXYGEN SATURATION: 95 % | HEIGHT: 73 IN

## 2021-04-23 DIAGNOSIS — Z00.00 ROUTINE ADULT HEALTH MAINTENANCE: ICD-10-CM

## 2021-04-23 DIAGNOSIS — E55.9 VITAMIN D DEFICIENCY: ICD-10-CM

## 2021-04-23 DIAGNOSIS — Z13.228 SCREENING FOR METABOLIC DISORDER: ICD-10-CM

## 2021-04-23 DIAGNOSIS — Z13.21 ENCOUNTER FOR VITAMIN DEFICIENCY SCREENING: ICD-10-CM

## 2021-04-23 DIAGNOSIS — Z13.6 SCREENING FOR CARDIOVASCULAR CONDITION: ICD-10-CM

## 2021-04-23 PROCEDURE — 99395 PREV VISIT EST AGE 18-39: CPT | Performed by: NURSE PRACTITIONER

## 2021-04-23 ASSESSMENT — FIBROSIS 4 INDEX: FIB4 SCORE: 0.88

## 2021-04-23 ASSESSMENT — PATIENT HEALTH QUESTIONNAIRE - PHQ9: CLINICAL INTERPRETATION OF PHQ2 SCORE: 0

## 2021-04-23 NOTE — PROGRESS NOTES
Subjective:     CC:   Chief Complaint   Patient presents with   • Follow-Up     cellulitis        HPI:   Cyrus Zuniga is a 31 y.o. male who presents for annual exam    Last Colorectal Cancer Screening: Not yet indicated  Last Tdap: 2020  Received HPV series: Aged out  Hx STDs: No    Exercise: moderate regular exercise, aerobic < 3 days a week  Diet: Well-balanced, no major restrictions    He  has no past medical history on file.  He  has no past surgical history on file.    Family History   Problem Relation Age of Onset   • No Known Problems Mother    • No Known Problems Father    • No Known Problems Sister      Social History     Tobacco Use   • Smoking status: Never Smoker   • Smokeless tobacco: Never Used   Substance Use Topics   • Alcohol use: Yes     Alcohol/week: 1.2 - 1.8 oz     Types: 2 - 3 Cans of beer per week     Comment: occ   • Drug use: No     He  reports being sexually active and has had partner(s) who are Female. He reports using the following method of birth control/protection: Pill.    Patient Active Problem List    Diagnosis Date Noted   • Chronic nasal congestion 07/27/2020   • Folliculitis of left axilla 07/27/2020     No current outpatient medications on file.     No current facility-administered medications for this visit.     No Known Allergies    Review of Systems   Constitutional: Negative for fever, chills and malaise/fatigue.   HENT: Negative for congestion.    Eyes: Negative for pain.   Respiratory: Negative for cough and shortness of breath.    Cardiovascular: Negative for chest pain and leg swelling.   Gastrointestinal: Negative for nausea, vomiting, abdominal pain and diarrhea.   Genitourinary: Negative for dysuria and hematuria.   Skin: Negative for rash.   Neurological: Negative for dizziness, focal weakness and headaches.   Endo/Heme/Allergies: Does not bruise/bleed easily.   Psychiatric/Behavioral: Negative for depression.  The patient is not nervous/anxious.      Objective:   BP  "118/70 (BP Location: Right arm, Patient Position: Sitting, BP Cuff Size: Large adult)   Pulse 85   Temp 37.2 °C (98.9 °F) (Temporal)   Ht 1.854 m (6' 1\")   Wt 121 kg (267 lb)   SpO2 95%   BMI 35.23 kg/m²      Wt Readings from Last 4 Encounters:   04/23/21 121 kg (267 lb)   07/27/20 120 kg (265 lb)   07/16/20 119 kg (263 lb)   06/18/20 118 kg (261 lb)           Physical Exam:  Constitutional: Well-developed and well-nourished. Not diaphoretic. No distress.   Skin: Skin is warm and dry. No rash noted.  Head: Atraumatic without lesions.  Eyes: Conjunctivae and extraocular motions are normal. Pupils are equal, round, and reactive to light. No scleral icterus.   Ears:  External ears unremarkable. Tympanic membranes clear and intact.  Nose: Nares patent. Septum midline. Turbinates without erythema nor edema. No discharge.   Mouth/Throat: Tongue normal. Oropharynx is clear and moist. Posterior pharynx without erythema or exudates.  Neck: Supple, trachea midline. Normal range of motion. No thyromegaly present. No lymphadenopathy--cervical or supraclavicular.  Cardiovascular: Regular rate and rhythm, S1 and S2 without murmur, rubs, or gallops.    Respiratory: Effort normal. Clear to auscultation throughout. No adventitious sounds.   Abdomen: Soft, non tender, and without distention. Active bowel sounds in all four quadrants. No rebound, guarding, masses or HSM.  Extremities: No cyanosis, clubbing, erythema, nor edema. Distal pulses intact and symmetric.   Musculoskeletal: All major joints AROM full in all directions without pain.  Neurological: Alert and oriented x 3. Grossly non-focal. Strength and sensation grossly intact. DTRs 2+/3 and symmetric.   Psychiatric:  Behavior, mood, and affect are appropriate.      Assessment and Plan:     1. Screening for cardiovascular condition  - CBC WITH DIFFERENTIAL; Future  - Lipid Profile; Future    2. Routine adult health maintenance  - CBC WITH DIFFERENTIAL; Future  - Comp " Metabolic Panel; Future  - HEMOGLOBIN A1C; Future  - Lipid Profile; Future  - VITAMIN D,25 HYDROXY; Future    3. Vitamin D deficiency  - VITAMIN D,25 HYDROXY; Future    4. Encounter for vitamin deficiency screening  - CBC WITH DIFFERENTIAL; Future  - Comp Metabolic Panel; Future  - VITAMIN D,25 HYDROXY; Future    5. Screening for metabolic disorder  - Comp Metabolic Panel; Future  - HEMOGLOBIN A1C; Future  - Lipid Profile; Future      Health maintenance:     Labs per orders  Immunizations per orders  Patient counseled about skin care, diet, supplements, and exercise.  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    Follow-up: Return in about 1 year (around 4/23/2022).

## 2022-04-17 ENCOUNTER — OFFICE VISIT (OUTPATIENT)
Dept: URGENT CARE | Facility: CLINIC | Age: 32
End: 2022-04-17
Payer: COMMERCIAL

## 2022-04-17 VITALS
RESPIRATION RATE: 16 BRPM | SYSTOLIC BLOOD PRESSURE: 128 MMHG | OXYGEN SATURATION: 96 % | TEMPERATURE: 98.3 F | HEIGHT: 73 IN | WEIGHT: 276.8 LBS | DIASTOLIC BLOOD PRESSURE: 84 MMHG | HEART RATE: 105 BPM | BODY MASS INDEX: 36.68 KG/M2

## 2022-04-17 DIAGNOSIS — J01.90 ACUTE BACTERIAL RHINOSINUSITIS: ICD-10-CM

## 2022-04-17 DIAGNOSIS — B96.89 ACUTE BACTERIAL RHINOSINUSITIS: ICD-10-CM

## 2022-04-17 PROCEDURE — 99203 OFFICE O/P NEW LOW 30 MIN: CPT | Performed by: STUDENT IN AN ORGANIZED HEALTH CARE EDUCATION/TRAINING PROGRAM

## 2022-04-17 RX ORDER — MOMETASONE FUROATE 50 UG/1
2 SPRAY, METERED NASAL
Qty: 1 EACH | Refills: 1 | Status: SHIPPED | OUTPATIENT
Start: 2022-04-17 | End: 2022-05-25

## 2022-04-17 RX ORDER — AMOXICILLIN AND CLAVULANATE POTASSIUM 875; 125 MG/1; MG/1
1 TABLET, FILM COATED ORAL 2 TIMES DAILY
Qty: 10 TABLET | Refills: 0 | Status: SHIPPED | OUTPATIENT
Start: 2022-04-17 | End: 2022-04-22

## 2022-04-17 RX ORDER — BENZONATATE 100 MG/1
200 CAPSULE ORAL 3 TIMES DAILY PRN
Qty: 60 CAPSULE | Refills: 0 | Status: SHIPPED | OUTPATIENT
Start: 2022-04-17 | End: 2022-05-25

## 2022-04-17 NOTE — PROGRESS NOTES
"Subjective:   CHIEF COMPLAINT  Chief Complaint   Patient presents with   • Cough     Cough y congestion for 7 days, taking cough drops/dayquil. Dayquil not helping. Some shortness of breath, runny nose,phlegm       HPI  Cyrus Zuniga is a 33 y.o. male who presents with a chief complaint of runny nose, nasal congestion, wet cough for approximately 1 week.  Symptoms seem to be getting worse.  He has tried cough drops and DayQuil which provide transient relief of symptoms.  Nasal congestion gets progressively worse throughout the day.  No additional modifying factors.  Positive review of systems for slight wheezing and shortness of breath.  Patient is vaccine against COVID x2.  No booster.    REVIEW OF SYSTEMS  General: no fever or chills  GI: no nausea or vomiting  See HPI for further details.    PAST MEDICAL HISTORY  There are no problems to display for this patient.      SURGICAL HISTORY  patient denies any surgical history    ALLERGIES  No Known Allergies    CURRENT MEDICATIONS  Home Medications     Reviewed by Bogdan Chase'marian (Medical Assistant) on 04/17/22 at 1235  Med List Status: <None>   Medication Last Dose Status        Patient Mango Taking any Medications                       SOCIAL HISTORY  Social History     Tobacco Use   • Smoking status: Never Smoker   • Smokeless tobacco: Never Used   Substance and Sexual Activity   • Alcohol use: Yes     Alcohol/week: 1.2 - 1.8 oz     Types: 2 - 3 Cans of beer per week     Comment: once per 1-3 months   • Drug use: Never   • Sexual activity: Yes       FAMILY HISTORY  No family history on file.       Objective:   PHYSICAL EXAM  VITAL SIGNS: /84 (BP Location: Right arm, Patient Position: Sitting, BP Cuff Size: Adult)   Pulse (!) 105   Temp 36.8 °C (98.3 °F) (Temporal)   Resp 16   Ht 1.854 m (6' 1\")   Wt (!) 126 kg (276 lb 12.8 oz)   SpO2 96%   BMI 36.52 kg/m²     Gen: no acute distress, normal voice  Skin: dry, intact, moist mucosal " membranes  Lungs: CTAB w/ symmetric expansion  CV: RRR w/o murmurs or clicks  Psych: normal affect, normal judgement, alert, awake    Assessment/Plan:     1. Acute bacterial rhinosinusitis  mometasone (NASONEX) 50 MCG/ACT nasal spray    benzonatate (TESSALON) 100 MG Cap    amoxicillin-clavulanate (AUGMENTIN) 875-125 MG Tab       Differential diagnosis, natural history, supportive care, and indications for immediate follow-up discussed. All questions answered. Patient agrees with the plan of care.    Follow-up as needed if symptoms worsen or fail to improve to PCP, Urgent care or Emergency Room.    Please note that this dictation was created using voice recognition software. I have made a reasonable attempt to correct obvious errors, but I expect that there are errors of grammar and possibly content that I did not discover before finalizing the note.

## 2022-04-17 NOTE — LETTER
April 17, 2022       Patient: Cyrus Zuniga   YOB: 1989   Date of Visit: 4/17/2022         To Whom It May Concern:    Cyrus Zuniga was seen in urgent care on 4/17/2022 for his doctor's appointment.    If you have any questions or concerns, please don't hesitate to call [phone]          Sincerely,          Enzo Hollins D.O.  Electronically Signed

## 2022-04-18 ENCOUNTER — TELEPHONE (OUTPATIENT)
Dept: URGENT CARE | Facility: CLINIC | Age: 32
End: 2022-04-18
Payer: COMMERCIAL

## 2022-04-18 ENCOUNTER — TELEPHONE (OUTPATIENT)
Dept: URGENT CARE | Facility: PHYSICIAN GROUP | Age: 32
End: 2022-04-18
Payer: COMMERCIAL

## 2022-04-18 NOTE — TELEPHONE ENCOUNTER
Pt call: pt states doing worse, fever 101. Would like to know what to do. Pt also requesting Dr's note for today missing of work. Pt is active on [employer], however pt has two charts opened due to incorrect . Working on merging charts.Correct PT MRN for chart to message is [id number], with correct : 1990.

## 2022-05-25 ENCOUNTER — HOSPITAL ENCOUNTER (EMERGENCY)
Facility: MEDICAL CENTER | Age: 32
End: 2022-05-25
Attending: EMERGENCY MEDICINE
Payer: COMMERCIAL

## 2022-05-25 VITALS
SYSTOLIC BLOOD PRESSURE: 119 MMHG | RESPIRATION RATE: 18 BRPM | WEIGHT: 278.22 LBS | DIASTOLIC BLOOD PRESSURE: 92 MMHG | OXYGEN SATURATION: 96 % | TEMPERATURE: 97.9 F | HEART RATE: 79 BPM | BODY MASS INDEX: 36.87 KG/M2 | HEIGHT: 73 IN

## 2022-05-25 DIAGNOSIS — L02.91 ABSCESS: ICD-10-CM

## 2022-05-25 PROCEDURE — 99283 EMERGENCY DEPT VISIT LOW MDM: CPT

## 2022-05-25 PROCEDURE — A9270 NON-COVERED ITEM OR SERVICE: HCPCS | Performed by: EMERGENCY MEDICINE

## 2022-05-25 PROCEDURE — 700102 HCHG RX REV CODE 250 W/ 637 OVERRIDE(OP): Performed by: EMERGENCY MEDICINE

## 2022-05-25 RX ORDER — SULFAMETHOXAZOLE AND TRIMETHOPRIM 800; 160 MG/1; MG/1
1 TABLET ORAL ONCE
Status: COMPLETED | OUTPATIENT
Start: 2022-05-25 | End: 2022-05-25

## 2022-05-25 RX ORDER — SULFAMETHOXAZOLE AND TRIMETHOPRIM 800; 160 MG/1; MG/1
1 TABLET ORAL 2 TIMES DAILY
Qty: 14 TABLET | Refills: 0 | Status: SHIPPED | OUTPATIENT
Start: 2022-05-25 | End: 2022-06-01

## 2022-05-25 RX ADMIN — SULFAMETHOXAZOLE AND TRIMETHOPRIM 1 TABLET: 800; 160 TABLET ORAL at 16:24

## 2022-05-25 ASSESSMENT — FIBROSIS 4 INDEX: FIB4 SCORE: 0.91

## 2022-05-25 NOTE — ED PROVIDER NOTES
"ED Provider Note    CHIEF COMPLAINT  Chief Complaint   Patient presents with   • Wound Check   • Wound Infection     Patient report he has a wound to the right wrist for the past 5-6 days.  Patient not sure if its a spider bite.  Wound with serous drainage.  No history of diabetes or no immunocompromised.        HPI  Cyrus Zuniga is a 32 y.o. male who presents to the emergency department with a draining wound.  He had a bug bite appearing lesion for about 5 days.  Today it opened up and drained pus.  He now has a hole in his right volar wrist region.  He washed the area with peroxide and put some ointment on it.  He has a history of MRSA in the past on his elbow.  He has not had fevers or chills.  No trauma.    REVIEW OF SYSTEMS  As per HPI, otherwise a 10 point review of systems is negative    PAST MEDICAL HISTORY  Past Medical History:   Diagnosis Date   • Staph infection     left elbow       SOCIAL HISTORY  Social History     Tobacco Use   • Smoking status: Never Smoker   • Smokeless tobacco: Never Used   Vaping Use   • Vaping Use: Never used   Substance Use Topics   • Alcohol use: Yes     Alcohol/week: 1.2 - 1.8 oz     Types: 2 - 3 Cans of beer per week     Comment: once per 1-3 months   • Drug use: Never       SURGICAL HISTORY  History reviewed. No pertinent surgical history.    CURRENT MEDICATIONS  Home Medications     Reviewed by Natasha Mario R.N. (Registered Nurse) on 05/25/22 at 1524  Med List Status: Complete   Medication Last Dose Status        Patient Mango Taking any Medications                       ALLERGIES  No Known Allergies    PHYSICAL EXAM  VITAL SIGNS: BP (!) 126/93   Pulse 86   Temp 36.7 °C (98.1 °F) (Temporal)   Resp 16   Ht 1.854 m (6' 1\")   Wt (!) 126 kg (278 lb 3.5 oz)   SpO2 96%   BMI 36.71 kg/m²    Constitutional: Awake and alert  HENT: Normal inspection  Eyes: Normal inspection  Neck: Grossly normal range of motion.  Cardiovascular: Normal heart rate  Thorax & Lungs: No " respiratory distress  Skin: There is a open abscess appearing skin lesion over the right volar forearm with mild surrounding cellulitis.  Extremities: As noted above  Neurologic: Grossly normal   Psychiatric: Normal for situation        Medications   sulfamethoxazole-trimethoprim (BACTRIM DS) 800-160 MG tablet 1 Tablet (has no administration in time range)         COURSE & MEDICAL DECISION MAKING  Patient presents with a skin abscess that has spontaneously drained.  There is no severe cellulitis although minor cellulitis is present.  He will be covered with Bactrim given his history of MRSA.  Advised washing daily with soap and water.  Keep clean.  Return to ER for high fevers, worsening redness, not improving or concern.    FINAL IMPRESSION  1.  Subcutaneous abscess, spontaneously drained      This dictation was created using voice recognition software. The accuracy of the dictation is limited to the abilities of the software.  The nursing notes were reviewed and certain aspects of this information were incorporated into this note.      Electronically signed by: Joel Victor M.D., 5/25/2022 3:52 PM

## 2022-05-25 NOTE — ED NOTES
Wound irrigated, bacitracin & gauze applied, taped on 3-sided per ERP for drainage; Pt tolerated well;

## 2022-05-25 NOTE — ED TRIAGE NOTES
"32 yr old male to triage  Chief Complaint   Patient presents with   • Wound Check   • Wound Infection     Patient report he has a wound to the right wrist for the past 5-6 days.  Patient not sure if its a spider bite.  Wound with serous drainage.  No history of diabetes or no immunocompromised.      BP (!) 126/93   Pulse 86   Temp 36.7 °C (98.1 °F) (Temporal)   Resp 16   Ht 1.854 m (6' 1\")   Wt (!) 126 kg (278 lb 3.5 oz)   SpO2 96%   BMI 36.71 kg/m²     Has this patient been vaccinated for COVID Yes   If not, would they like to be vaccinated while in the ER if eligible?  No   Would the patient like to speak with the ERP about the possibility of receiving the COVID vaccine today before making a decision? No     "

## 2022-06-03 ENCOUNTER — OFFICE VISIT (OUTPATIENT)
Dept: MEDICAL GROUP | Facility: MEDICAL CENTER | Age: 32
End: 2022-06-03
Payer: COMMERCIAL

## 2022-06-03 VITALS
SYSTOLIC BLOOD PRESSURE: 126 MMHG | HEIGHT: 73 IN | DIASTOLIC BLOOD PRESSURE: 82 MMHG | HEART RATE: 67 BPM | BODY MASS INDEX: 36.34 KG/M2 | WEIGHT: 274.2 LBS | TEMPERATURE: 97.7 F | OXYGEN SATURATION: 97 %

## 2022-06-03 DIAGNOSIS — L08.9 SKIN INFECTION: ICD-10-CM

## 2022-06-03 DIAGNOSIS — E66.9 OBESITY (BMI 35.0-39.9 WITHOUT COMORBIDITY): ICD-10-CM

## 2022-06-03 DIAGNOSIS — M25.561 CHRONIC PAIN OF RIGHT KNEE: ICD-10-CM

## 2022-06-03 DIAGNOSIS — G89.29 CHRONIC PAIN OF RIGHT KNEE: ICD-10-CM

## 2022-06-03 PROBLEM — L73.9 FOLLICULITIS OF LEFT AXILLA: Status: RESOLVED | Noted: 2020-07-27 | Resolved: 2022-06-03

## 2022-06-03 PROCEDURE — 99214 OFFICE O/P EST MOD 30 MIN: CPT | Performed by: FAMILY MEDICINE

## 2022-06-03 ASSESSMENT — PATIENT HEALTH QUESTIONNAIRE - PHQ9: CLINICAL INTERPRETATION OF PHQ2 SCORE: 0

## 2022-06-03 ASSESSMENT — FIBROSIS 4 INDEX: FIB4 SCORE: 0.91

## 2022-06-03 NOTE — ASSESSMENT & PLAN NOTE
This appears to be healing.  He is keeping it covered.  The wound is much smaller and nondraining.  He has finished his course of Bactrim 2 days ago without adverse effects.  We will continue to monitor and allow for routine healing.

## 2022-06-03 NOTE — ASSESSMENT & PLAN NOTE
Patient with 4 years of sharp pain when he puts anterior pressure on his knee such as with kneeling.  First noted at a previous job when he kneeled down on some concrete work on her car.  He avoids his motion and generally does not have pain.  We will get x-rays to evaluate for any changes the bony anatomy.

## 2022-06-03 NOTE — PROGRESS NOTES
"Subjective:     CC:  There were no encounter diagnoses.    HISTORY OF THE PRESENT ILLNESS: Patient is a 32 y.o. male. This pleasant patient is here today to establish care and discuss ED follow-up.  His [place] PCP [place]    Right hand dominant  Thought he was bit by a spider on right wrist. Ended up having an abscessed that drained spontaneously. Seen in ED 5/25/2022, concern for subcutaneous abscess with history of MRSA and was treated with Bactrim as it already drained spontaneously. Feel like its healing. Finished abx course 2 days ago and feels like things are improving.  Denies systemic sick symptoms.    Gets chronic knee pain with pressure on anterior knee. He is able to walk and run without pain. Kneeling on his knee is very painful. Has not been evaluated. No paraesthesia, no weakness, doesn't feel weak or going to give out. This has been present for about 4 years, stable over this time. He generally avoids putting pressure on it. It started happening at work when he kneeled down on concrete and noticed it.     No problems updated.    No current Epic-ordered outpatient medications on file.     No current just.me-ordered facility-administered medications on file.       Health Maintenance: Up-to-date other than COVID booster    ROS:   ROS      Objective:       Exam: /82   Pulse 67   Temp 36.5 °C (97.7 °F) (Temporal)   Ht 1.854 m (6' 1\")   Wt 124 kg (274 lb 3.2 oz)   SpO2 97%  Body mass index is 36.18 kg/m².    Physical Exam  Vitals reviewed.   Constitutional:       General: He is not in acute distress.     Appearance: Normal appearance.   HENT:      Head: Normocephalic and atraumatic.      Right Ear: Tympanic membrane normal.      Left Ear: Tympanic membrane normal.   Cardiovascular:      Rate and Rhythm: Normal rate and regular rhythm.      Heart sounds: Normal heart sounds.   Pulmonary:      Effort: Pulmonary effort is normal.      Breath sounds: Normal breath sounds.   Musculoskeletal: "         General: No swelling, tenderness or deformity. Normal range of motion.      Comments: Negative anterior and posterior drawer of right knee  Mild crepitus with lateral and medial patella motion but no laxity noted  I could not reproduce the pain by applying anterior pressure to the knee.   Skin:     General: Skin is warm and dry.      Findings: Erythema present.      Comments: Patient has some redness to his forearms he tells me he has been using chlorhexidine scrub as his work is a dirty environment when he gets caught and infections easily there.    Volar wrist has pinhole that is much smaller than the picture he has on his phone when it initially opened and drained.  It is nondraining.         Neurological:      Mental Status: He is alert. Mental status is at baseline.   Psychiatric:         Mood and Affect: Mood normal.         Behavior: Behavior normal.           Assessment & Plan:   32 y.o. male with the following -    Problem List Items Addressed This Visit    None         No follow-ups on file.    Please note that this dictation was created using voice recognition software. I have made every reasonable attempt to correct obvious errors, but I expect that there are errors of grammar and possibly content that I did not discover before finalizing the note.

## 2024-03-26 NOTE — PROGRESS NOTES
Triage officer note /transfer note     Room 37    D/W Dr Victor      CC: left upper arm swelling and pain, pain op abx      ED course: left upper arm cellulitis, no definitive fluid collection, need iv abx     Need to do: iv abx     Admit to     Inpatient medical.      Admitting hospitalist is Dr Bridges                Unknown if ever smoked
